# Patient Record
Sex: FEMALE | Race: BLACK OR AFRICAN AMERICAN | Employment: FULL TIME | URBAN - METROPOLITAN AREA
[De-identification: names, ages, dates, MRNs, and addresses within clinical notes are randomized per-mention and may not be internally consistent; named-entity substitution may affect disease eponyms.]

---

## 2018-01-25 ENCOUNTER — HOSPITAL ENCOUNTER (OUTPATIENT)
Dept: MAMMOGRAPHY | Age: 63
Discharge: HOME | End: 2018-01-25
Attending: FAMILY MEDICINE
Payer: COMMERCIAL

## 2018-01-25 ENCOUNTER — OFFICE VISIT (OUTPATIENT)
Dept: FAMILY MEDICINE CLINIC | Age: 63
End: 2018-01-25

## 2018-01-25 VITALS
OXYGEN SATURATION: 98 % | HEIGHT: 62 IN | SYSTOLIC BLOOD PRESSURE: 136 MMHG | WEIGHT: 182.9 LBS | RESPIRATION RATE: 20 BRPM | TEMPERATURE: 98 F | DIASTOLIC BLOOD PRESSURE: 88 MMHG | BODY MASS INDEX: 33.66 KG/M2 | HEART RATE: 68 BPM

## 2018-01-25 DIAGNOSIS — M65.311 TRIGGER FINGER OF RIGHT THUMB: Primary | ICD-10-CM

## 2018-01-25 DIAGNOSIS — Z12.31 VISIT FOR SCREENING MAMMOGRAM: ICD-10-CM

## 2018-01-25 PROCEDURE — 77063 BREAST TOMOSYNTHESIS BI: CPT

## 2018-01-25 RX ORDER — NAPROXEN 500 MG/1
500 TABLET ORAL 2 TIMES DAILY WITH MEALS
Qty: 20 TAB | Refills: 0 | Status: SHIPPED | OUTPATIENT
Start: 2018-01-25 | End: 2018-05-09 | Stop reason: SDUPTHER

## 2018-01-25 NOTE — LETTER
NOTIFICATION RETURN TO WORK  
 
1/25/2018 8:44 AM 
 
Ms. Autumn ERIC Coaxum [address] [place] [address] [phone] To Whom It May Concern: 
 
Jamee Castillo is currently under the care of [address] [place] [address]. She will be off of work today, 1/25/18 and return to work on her regularly scheduled day. If there are questions or concerns please have the patient contact our office.  
 
 
 
Sincerely, 
 
 
Josefina Calle NP

## 2018-01-25 NOTE — PROGRESS NOTES
1. Have you been to the ER, urgent care clinic since your last visit? Hospitalized since your last visit? No    2. Have you seen or consulted any other health care providers outside of the [address] [place] [address] since your last visit? Include any pap smears or colon screening. No  Chief Complaint   Patient presents with    Finger Pain     Right finger thumb locking up on patient.

## 2018-01-25 NOTE — MR AVS SNAPSHOT
[address] [place] [id number] 
[phone] Patient: Malena Toro MRN: [id number] :1955 Visit Information Date & Time Provider Department Dept. Phone Encounter #  
 2018  8:30 AM Kedar Ray NP [address] [address] [phone] [id number] Follow-up Instructions Return if symptoms worsen or fail to improve. Upcoming Health Maintenance Date Due Hepatitis C Screening 1956 DTaP/Tdap/Td series (1 - Tdap) 1977 FOBT Q 1 YEAR AGE 50-75 2006 ZOSTER VACCINE AGE 60> 10/17/2016 Influenza Age 5 to Adult 2017 PAP AKA CERVICAL CYTOLOGY 2017 BREAST CANCER SCRN MAMMOGRAM 2018 Allergies as of 2018  Review Complete On: 2018 By: Kedar Ray NP No Known Allergies Current Immunizations  Never Reviewed No immunizations on file. Not reviewed this visit You Were Diagnosed With   
  
 Codes Comments Trigger finger of right thumb    -  Primary ICD-10-CM: W75.128 ICD-9-CM: 727.03 Vitals BP Pulse Temp Resp Height(growth percentile) Weight(growth percentile) 136/88 68 98 °F (36.7 °C) (Oral) 20 5' 2\" (1.575 m) 182 lb 14.4 oz (83 kg) SpO2 BMI OB Status Smoking Status 98% 33.45 kg/m2 Menopause Former Smoker BMI and BSA Data Body Mass Index Body Surface Area  
 33.45 kg/m 2 1.91 m 2 Preferred Pharmacy Pharmacy Name Phone [facility], [address] [phone] Your Updated Medication List  
  
   
This list is accurate as of: 18  8:48 AM.  Always use your most recent med list.  
  
  
  
  
 naproxen 500 mg tablet Commonly known as:  NAPROSYN Take 1 Tab by mouth two (2) times daily (with meals). Prescriptions Sent to Pharmacy  Refills  
 naproxen (NAPROSYN) 500 mg tablet 0  
 Sig: Take 1 Tab by mouth two (2) times daily (with meals). Class: Normal  
 Pharmacy: [email or web address] [address] [place] [address], [address] [place] [address] #: [phone] Route: Oral  
  
Follow-up Instructions Return if symptoms worsen or fail to improve. Patient Instructions Trigger Finger: Care Instructions Your Care Instructions A trigger finger is a finger stuck in a bent position. The bent finger usually straightens out on its own. A trigger finger can be painful, but it normally is not a serious problem. Trigger fingers seem to occur more in some groups of people. These include people who have diabetes or arthritis or who have injured their hands in the past. This problem also occurs in musicians and people who  tools often. Rest, exercises, and other things you can do at home may help your trigger finger relax so that it can bend as it should. You may get a corticosteroid shot. This can reduce swelling and pain. Your doctor may put a splint on your finger. This will give your finger some rest and avoid irritating the joint. You may need surgery if the finger keeps locking in a bent position. Follow-up care is a key part of your treatment and safety. Be sure to make and go to all appointments, and call your doctor if you are having problems. It's also a good idea to know your test results and keep a list of the medicines you take. How can you care for yourself at home? · If your doctor put a splint on your finger, wear the splint as directed. Do not remove it until your doctor says you can. · You may need to change your activities to avoid movements that irritate the finger. · If your finger is swollen, put ice or a cold pack on your finger for 10 to 20 minutes at a time. Try to do this every 1 to 2 hours for the next 3 days (when you are awake) or until the swelling goes down. Put a thin cloth between the ice and your skin. · Prop up your hand on a pillow when you ice it or anytime you sit or lie down during the next 3 days. Try to keep it above the level of your heart. This will help reduce swelling. · Take your medicines exactly as prescribed. Call your doctor if you think you are having a problem with your medicine. · Ask your doctor if you can take an over-the-counter pain medicine, such as acetaminophen (Tylenol), ibuprofen (Advil, Motrin), or naproxen (Aleve). Be safe with medicines. Read and follow all instructions on the label. · If your doctor recommends exercises, do them as directed. When should you call for help? Call your doctor now or seek immediate medical care if: 
? · Your finger locks in a bent position and will not straighten. ? Watch closely for changes in your health, and be sure to contact your doctor if: 
? · You do not get better as expected. Where can you learn more? Go to http://joelle-mariel.info/. Enter M826 in the search box to learn more about \"Trigger Finger: Care Instructions. \" Current as of: March 21, 2017 Content Version: 11.4 © 0260-4477 [employer]. Care instructions adapted under license by [employer] (which disclaims liability or warranty for this information). If you have questions about a medical condition or this instruction, always ask your healthcare professional. [address] any warranty or liability for your use of this information. Introducing [facility] & HEALTH SERVICES! Lucrecia Chaudhary introduces [employer] patient portal. Now you can access parts of your medical record, email your doctor's office, and request medication refills online. 1. In your internet browser, go to [email or web address] 2. Click on the First Time User? Click Here link in the Sign In box. You will see the New Member Sign Up page. 3. Enter your [employer] Access Code exactly as it appears below.  You will not need to use this code after youve completed the sign-up process. If you do not sign up before the expiration date, you must request a new code. · [employer] Access Code: [email or web address] Expires: 4/19/2018  2:58 PM 
 
4. Enter the last four digits of your Social Security Number (xxxx) and Date of Birth (mm/dd/yyyy) as indicated and click Submit. You will be taken to the next sign-up page. 5. Create a [employer] ID. This will be your [employer] login ID and cannot be changed, so think of one that is secure and easy to remember. 6. Create a [employer] password. You can change your password at any time. 7. Enter your Password Reset Question and Answer. This can be used at a later time if you forget your password. 8. Enter your e-mail address. You will receive e-mail notification when new information is available in [address] 9. Click Sign Up. You can now view and download portions of your medical record. 10. Click the Download Summary menu link to download a portable copy of your medical information. If you have questions, please visit the Frequently Asked Questions section of the [employer] website. Remember, [employer] is NOT to be used for urgent needs. For medical emergencies, dial 911. Now available from your iPhone and Android! Please provide this summary of care documentation to your next provider. Your primary care clinician is listed as Dayo Pierce. If you have any questions after today's visit, please call [phone].

## 2018-01-25 NOTE — PROGRESS NOTES
Haile Maciel is a 65 y.o.  female and presents with    Chief Complaint   Patient presents with    Finger Pain       Subjective:  Ms. Brooke Samuels presents today with complaints of pain to her right thumb. Pain has been present for the past 3 weeks. She states she has had swelling. When she tries to  her thumb she feels a \"clicking\" sensation. She has had to tape up her finger in order to function. She has never had this before. She denies injury/trauma. She is right handed. She has not tried any OTC medications for her symptoms. She has tried running hot water over her thumb with some relief. Additional Concerns: No       Patient Active Problem List   Diagnosis Code    Postmenopausal state Z78.0    Dyspareunia [id number]       No Known Allergies  History reviewed. No pertinent past medical history. History reviewed. No pertinent surgical history. Family History   Problem Relation Age of Onset    Liver Disease Mother      cirrohosis     Social History   Substance Use Topics    Smoking status: Former Smoker    Smokeless tobacco: Never Used    Alcohol use Yes      Comment: socially        ROS   History obtained from the patient  General ROS: negative for - chills or fever  Musculoskeletal ROS: positive for -right thumb joint stiffness and joint swelling    All other systems reviewed and are negative. Objective:  Vitals:    01/25/18 0830   BP: 136/88   Pulse: 68   Resp: 20   Temp: 98 °F (36.7 °C)   TempSrc: Oral   SpO2: 98%   Weight: 182 lb 14.4 oz (83 kg)   Height: 5' 2\" (1.575 m)   PainSc:   8   PainLoc: Finger       PE  General appearance - alert, well appearing, and in no distress  Mental status - normal mood, behavior, speech, dress, motor activity, and thought processes  Musculoskeletal - swelling noted to right thumb; normal cap refill; decreased ROM; tenderness to base of thumb    Assessment/Plan:    1.  Trigger Finger of Right Thumb- Naproxen BID x 10 days; spica splint; return to office if symptoms persist    Lab review: no lab studies available for review at time of visit      Today's Visit: Naproxen    Health Maintenance:     I have discussed the diagnosis with the patient and the intended plan as seen in the above orders. The patient has received an after-visit summary and questions were answered concerning future plans. I have discussed medication side effects and warnings with the patient as well. I have reviewed the plan of care with the patient, accepted their input and they are in agreement with the treatment goals. Follow-up Disposition:  Return if symptoms worsen or fail to improve. More than 1/2 of this 15 minute visit was spent in counseling and coordination of care, as described above.     Anna Constantino, SHERRIC

## 2018-01-25 NOTE — PATIENT INSTRUCTIONS
Trigger Finger: Care Instructions  Your Care Instructions  A trigger finger is a finger stuck in a bent position. The bent finger usually straightens out on its own. A trigger finger can be painful, but it normally is not a serious problem. Trigger fingers seem to occur more in some groups of people. These include people who have diabetes or arthritis or who have injured their hands in the past. This problem also occurs in musicians and people who  tools often. Rest, exercises, and other things you can do at home may help your trigger finger relax so that it can bend as it should. You may get a corticosteroid shot. This can reduce swelling and pain. Your doctor may put a splint on your finger. This will give your finger some rest and avoid irritating the joint. You may need surgery if the finger keeps locking in a bent position. Follow-up care is a key part of your treatment and safety. Be sure to make and go to all appointments, and call your doctor if you are having problems. It's also a good idea to know your test results and keep a list of the medicines you take. How can you care for yourself at home? · If your doctor put a splint on your finger, wear the splint as directed. Do not remove it until your doctor says you can. · You may need to change your activities to avoid movements that irritate the finger. · If your finger is swollen, put ice or a cold pack on your finger for 10 to 20 minutes at a time. Try to do this every 1 to 2 hours for the next 3 days (when you are awake) or until the swelling goes down. Put a thin cloth between the ice and your skin. · Prop up your hand on a pillow when you ice it or anytime you sit or lie down during the next 3 days. Try to keep it above the level of your heart. This will help reduce swelling. · Take your medicines exactly as prescribed. Call your doctor if you think you are having a problem with your medicine.   · Ask your doctor if you can take an over-the-counter pain medicine, such as acetaminophen (Tylenol), ibuprofen (Advil, Motrin), or naproxen (Aleve). Be safe with medicines. Read and follow all instructions on the label. · If your doctor recommends exercises, do them as directed. When should you call for help? Call your doctor now or seek immediate medical care if:  ? · Your finger locks in a bent position and will not straighten. ? Watch closely for changes in your health, and be sure to contact your doctor if:  ? · You do not get better as expected. Where can you learn more? Go to http://joelle-mariel.info/. Enter M826 in the search box to learn more about \"Trigger Finger: Care Instructions. \"  Current as of: March 21, 2017  Content Version: 11.4  © 3381-8168 [employer]. Care instructions adapted under license by [employer] (which disclaims liability or warranty for this information). If you have questions about a medical condition or this instruction, always ask your healthcare professional. [address] any warranty or liability for your use of this information.

## 2018-05-09 ENCOUNTER — OFFICE VISIT (OUTPATIENT)
Dept: FAMILY MEDICINE CLINIC | Age: 63
End: 2018-05-09

## 2018-05-09 VITALS
HEIGHT: 62 IN | WEIGHT: 176 LBS | HEART RATE: 65 BPM | BODY MASS INDEX: 32.39 KG/M2 | SYSTOLIC BLOOD PRESSURE: 139 MMHG | DIASTOLIC BLOOD PRESSURE: 88 MMHG | OXYGEN SATURATION: 99 % | RESPIRATION RATE: 16 BRPM | TEMPERATURE: 96 F

## 2018-05-09 DIAGNOSIS — Z12.11 SCREEN FOR COLON CANCER: ICD-10-CM

## 2018-05-09 DIAGNOSIS — M79.644 THUMB PAIN, RIGHT: Primary | ICD-10-CM

## 2018-05-09 DIAGNOSIS — Z13.220 SCREENING CHOLESTEROL LEVEL: ICD-10-CM

## 2018-05-09 DIAGNOSIS — Z11.59 NEED FOR HEPATITIS C SCREENING TEST: ICD-10-CM

## 2018-05-09 DIAGNOSIS — Z00.00 WELL WOMAN EXAM (NO GYNECOLOGICAL EXAM): ICD-10-CM

## 2018-05-09 RX ORDER — NAPROXEN 500 MG/1
500 TABLET ORAL 2 TIMES DAILY WITH MEALS
Qty: 60 TAB | Refills: 0 | Status: SHIPPED | OUTPATIENT
Start: 2018-05-09 | End: 2020-02-03

## 2018-05-09 NOTE — MR AVS SNAPSHOT
303 [facility] 
 
 
 [address] [facility] [address] [place] [address] 
[phone] Patient: Lisa Blandon MRN: [id number] :1955 Visit Information Date & Time Provider Department Dept. Phone Encounter #  
 2018 10:20 AM Ramirez Ann[address]  [id number] Follow-up Instructions Return in about 6 months (around 2018). Upcoming Health Maintenance Date Due Hepatitis C Screening 1956 DTaP/Tdap/Td series (1 - Tdap) 1977 FOBT Q 1 YEAR AGE 50-75 2006 ZOSTER VACCINE AGE 60> 10/17/2016 PAP AKA CERVICAL CYTOLOGY 2017 Influenza Age 5 to Adult 2018 BREAST CANCER SCRN MAMMOGRAM 2020 Allergies as of 2018  Review Complete On: 2018 By: Ramirez Ann DO No Known Allergies Current Immunizations  Never Reviewed No immunizations on file. Not reviewed this visit You Were Diagnosed With   
  
 Codes Comments Thumb pain, right    -  Primary ICD-10-CM: J56.710 ICD-9-CM: 729.5 Screening cholesterol level     ICD-10-CM: V98.782 ICD-9-CM: V77.91 Well woman exam (no gynecological exam)     ICD-10-CM: Z00.00 ICD-9-CM: V70.0 Screen for colon cancer     ICD-10-CM: Z12.11 ICD-9-CM: V76.51 Need for hepatitis C screening test     ICD-10-CM: Z11.59 
ICD-9-CM: V73.89 Vitals BP Pulse Temp Resp Height(growth percentile) Weight(growth percentile) 139/88 (BP 1 Location: Right arm, BP Patient Position: Sitting) 65 96 °F (35.6 °C) (Oral) 16 5' 2\" (1.575 m) 176 lb (79.8 kg) SpO2 BMI OB Status Smoking Status 99% 32.19 kg/m2 Menopause Former Smoker BMI and BSA Data Body Mass Index Body Surface Area  
 32.19 kg/m 2 1.87 m 2 Preferred Pharmacy Pharmacy Name Phone [facility], [address] [facility] 11 [facility] [phone] Your Updated Medication List  
  
   
This list is accurate as of 5/9/18 11:17 AM.  Always use your most recent med list.  
  
  
  
  
 naproxen 500 mg tablet Commonly known as:  NAPROSYN Take 1 Tab by mouth two (2) times daily (with meals). Prescriptions Sent to Pharmacy Refills  
 naproxen (NAPROSYN) 500 mg tablet 0 Sig: Take 1 Tab by mouth two (2) times daily (with meals). Class: Normal  
 Pharmacy: [facility] [address] [place] [address], [address] #: [phone] Route: Oral  
  
We Performed the Following REFERRAL TO ORTHOPEDIC SURGERY [REF62 Custom] Comments:  
 Patient prefers to stay in [place]. REFERRAL TO PHYSICAL THERAPY [BHV42 Custom] Follow-up Instructions Return in about 6 months (around 11/9/2018). To-Do List   
 05/09/2018 Lab:  HEPATITIS C AB   
  
 05/09/2018 Lab:  LIPID PANEL   
  
 05/09/2018 Lab:  METABOLIC PANEL, COMPREHENSIVE   
  
 06/08/2018 Lab:  OCCULT BLOOD, IMMUNOASSAY (FIT) Referral Information Referral ID Referred By Referred To  
  
 [id number] Clarissa Zambrano MD   
   [place] [address] [address] [place] [address] Phone: [phone] Fax: [phone] Visits Status Start Date End Date 1 New Request 5/9/18 5/9/19 If your referral has a status of pending review or denied, additional information will be sent to support the outcome of this decision. Referral ID Referred By Referred To  
 [id number] [facility], [facility]  
   Neetu Pateliredo 165 8[address] [facility], [address] [place] [address] Phone: [phone] Fax: [phone] Visits Status Start Date End Date 1 New Request 5/9/18 5/9/19 If your referral has a status of pending review or denied, additional information will be sent to support the outcome of this decision. Patient Instructions Well Visit, Women 48 to 72: Care Instructions Your Care Instructions Physical exams can help you stay healthy. Your doctor has checked your overall health and may have suggested ways to take good care of yourself. He or she also may have recommended tests. At home, you can help prevent illness with healthy eating, regular exercise, and other steps. Follow-up care is a key part of your treatment and safety. Be sure to make and go to all appointments, and call your doctor if you are having problems. It's also a good idea to know your test results and keep a list of the medicines you take. How can you care for yourself at home? · Reach and stay at a healthy weight. This will lower your risk for many problems, such as obesity, diabetes, heart disease, and high blood pressure. · Get at least 30 minutes of exercise on most days of the week. Walking is a good choice. You also may want to do other activities, such as running, swimming, cycling, or playing tennis or team sports. · Do not smoke. Smoking can make health problems worse. If you need help quitting, talk to your doctor about stop-smoking programs and medicines. These can increase your chances of quitting for good. · Protect your skin from too much sun. When you're outdoors from 10 a.m. to 4 p.m., stay in the shade or cover up with clothing and a hat with a wide brim. Wear sunglasses that block UV rays. Even when it's cloudy, put broad-spectrum sunscreen (SPF 30 or higher) on any exposed skin. · See a dentist one or two times a year for checkups and to have your teeth cleaned. · Wear a seat belt in the car. · Limit alcohol to 1 drink a day. Too much alcohol can cause health problems. Follow your doctor's advice about when to have certain tests. These tests can spot problems early. · Cholesterol.  Your doctor will tell you how often to have this done based on your age, family history, or other things that can increase your risk for heart attack and stroke. · Blood pressure. Have your blood pressure checked during a routine doctor visit. Your doctor will tell you how often to check your blood pressure based on your age, your blood pressure results, and other factors. · Mammogram. Ask your doctor how often you should have a mammogram, which is an X-ray of your breasts. A mammogram can spot breast cancer before it can be felt and when it is easiest to treat. · Pap test and pelvic exam. Ask your doctor how often you should have a Pap test. You may not need to have a Pap test as often as you used to. · Vision. Have your eyes checked every year or two or as often as your doctor suggests. Some experts recommend that you have yearly exams for glaucoma and other age-related eye problems starting at age 48. · Hearing. Tell your doctor if you notice any change in your hearing. You can have tests to find out how well you hear. · Diabetes. Ask your doctor whether you should have tests for diabetes. · Colon cancer. You should begin tests for colon cancer at age 48. You may have one of several tests. Your doctor will tell you how often to have tests based on your age and risk. Risks include whether you already had a precancerous polyp removed from your colon or whether your parents, sisters and brothers, or children have had colon cancer. · Thyroid disease. Talk to your doctor about whether to have your thyroid checked as part of a regular physical exam. Women have an increased chance of a thyroid problem. · Osteoporosis. You should begin tests for bone density at age 72. If you are younger than 72, ask your doctor whether you have factors that may increase your risk for this disease. You may want to have this test before age 72. · Heart attack and stroke risk. At least every 4 to 6 years, you should have your risk for heart attack and stroke assessed.  Your doctor uses factors such as your age, blood pressure, cholesterol, and whether you smoke or have diabetes to show what your risk for a heart attack or stroke is over the next 10 years. When should you call for help? Watch closely for changes in your health, and be sure to contact your doctor if you have any problems or symptoms that concern you. Where can you learn more? Go to http://joelle-mariel.info/. Enter J312 in the search box to learn more about \"Well Visit, Women 50 to 72: Care Instructions. \" Current as of: May 12, 2017 Content Version: 11.4 © 1219-7107 [employer]. Care instructions adapted under license by [employer] (which disclaims liability or warranty for this information). If you have questions about a medical condition or this instruction, always ask your healthcare professional. [address] any warranty or liability for your use of this information. Introducing [facility] & HEALTH SERVICES! Quan Ceja introduces [employer] patient portal. Now you can access parts of your medical record, email your doctor's office, and request medication refills online. 1. In your internet browser, go to [email or web address] 2. Click on the First Time User? Click Here link in the Sign In box. You will see the New Member Sign Up page. 3. Enter your N[employer] Access Code exactly as it appears below. You will not need to use this code after youve completed the sign-up process. If you do not sign up before the expiration date, you must request a new code. · [employer] Access Code: [email or web address] Expires: 8/7/2018  8:12 AM 
 
4. Enter the last four digits of your Social Security Number (xxxx) and Date of Birth (mm/dd/yyyy) as indicated and click Submit. You will be taken to the next sign-up page. 5. Create a Ntirety ID. This will be your Ntirety login ID and cannot be changed, so think of one that is secure and easy to remember. 6. Create a Peers Appt password. You can change your password at any time. 7. Enter your Password Reset Question and Answer. This can be used at a later time if you forget your password. 8. Enter your e-mail address. You will receive e-mail notification when new information is available in [address] 9. Click Sign Up. You can now view and download portions of your medical record. 10. Click the Download Summary menu link to download a portable copy of your medical information. If you have questions, please visit the Frequently Asked Questions section of the [employer] website. Remember, [employer] is NOT to be used for urgent needs. For medical emergencies, dial 911. Now available from your iPhone and Android! Please provide this summary of care documentation to your next provider. Your primary care clinician is listed as Tiara Martinez. If you have any questions after today's visit, please call [phone].

## 2018-05-09 NOTE — PROGRESS NOTES
Subjective:     HPI:  Alex Bains is a 65 y.o. female who presents to the office complaining of R thumb pain and skin irritation. R hand pain: Pt complains of pain in her R thumb for past 4 months. She visited Ms. Speedy NP on 1/25/2018 and was prescribed Naproxen for trigger finger of R thumb. She reports good relief of pain with Naproxen. She is soaking the joint with hot water and applying muscle rub on it. She continues to have pain. She is using a thumb splica spint to immobilize her thumb. 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 Gluteal cleft irritation: Pt complains of irritation and burning in her gluteal cleft for past 1 week. She reports that she sweats throughout the day. She cleans the area and apply Vaseline with relief of burning sensation. She admits to cleaning the area properly after bowel movement. Her last intimate contact was few years ago. Of note,   Pt quit smoking a year ago as she was not able to afford buying cigarettes anymore. Current Outpatient Prescriptions   Medication Sig Dispense Refill    naproxen (NAPROSYN) 500 mg tablet Take 1 Tab by mouth two (2) times daily (with meals). 60 Tab 0        No Known Allergies    History reviewed. No pertinent past medical history. History reviewed. No pertinent surgical history. Family History   Problem Relation Age of Onset    Liver Disease Mother      cirrohosis       Social History     Social History    Marital status: SINGLE     Spouse name: N/A    Number of children: N/A    Years of education: N/A     Occupational History    Not on file.      Social History Main Topics    Smoking status: Former Smoker     Types: Cigarettes    Smokeless tobacco: Never Used      Comment: quit 2017    Alcohol use Yes      Comment: socially     Drug use: No    Sexual activity: Not Currently     Other Topics Concern    Not on file     Social History Narrative       REVIEW OF SYSTEM:  Review of Systems   Constitutional: Negative for chills and fever. Eyes: Negative for blurred vision. Respiratory: Negative for shortness of breath. Cardiovascular: Negative for chest pain, palpitations and leg swelling. Gastrointestinal: Negative for constipation, diarrhea, nausea and vomiting. Musculoskeletal: Positive for joint pain (R thumb). Neurological: Negative for headaches. Objective:     Visit Vitals    /88 (BP 1 Location: Right arm, BP Patient Position: Sitting)    Pulse 65    Temp 96 °F (35.6 °C) (Oral)    Resp 16    Ht 5' 2\" (1.575 m)    Wt 176 lb (79.8 kg)    SpO2 99%    BMI 32.19 kg/m2       PHYSICAL EXAM:  Physical Exam   Constitutional: She is oriented to person, place, and time and well-developed, well-nourished, and in no distress. HENT:   Right Ear: Tympanic membrane, external ear and ear canal normal.   Left Ear: Tympanic membrane, external ear and ear canal normal.   Nose: Nose normal.   Mouth/Throat: Oropharynx is clear and moist.   Eyes: Pupils are equal, round, and reactive to light. Neck: Normal range of motion. Neck supple. No thyromegaly present. Cardiovascular: Normal rate, regular rhythm, normal heart sounds and intact distal pulses. No murmur heard. Pulmonary/Chest: Effort normal and breath sounds normal. She has no wheezes. Musculoskeletal:        Right hand: She exhibits tenderness. Normal strength noted. Hands:  Neurological: She is alert and oriented to person, place, and time. GCS score is 15. Skin: Skin is warm and dry. Vitals reviewed. Assessment/Plan:       ICD-10-CM ICD-9-CM    1. Thumb pain, right M79.644 729.5 REFERRAL TO ORTHOPEDIC SURGERY      REFERRAL TO PHYSICAL THERAPY      naproxen (NAPROSYN) 500 mg tablet   2. Screening cholesterol level W13.394 R46.77 METABOLIC PANEL, COMPREHENSIVE      LIPID PANEL   3. Well woman exam (no gynecological exam) Z00.00 V70.0    4.  Screen for colon cancer Z12.11 V76.51 OCCULT BLOOD, IMMUNOASSAY (FIT)   5. Need for hepatitis C screening test Z11.59 V73.89 HEPATITIS C AB     Patient given opportunity to ask questions. Questions answered. Take Naproxen as needed with food. Pt referred to physical therapy for R thumb pain. Patient understands plan of care. Follow-up Disposition: Not on File    Written by Cedrick Haywood, as dictated by Alice Mcdonough DO.    I, Dr. Alice Mcdonough, confirm that all documentation is accurate.

## 2018-05-09 NOTE — PROGRESS NOTES
1. Have you been to the ER, urgent care clinic since your last visit? Hospitalized since your last visit? No    2. Have you seen or consulted any other health care providers outside of the [address] [place] [address] since your last visit? Include any pap smears or colon screening.  No     Patient presents in office today for CPE  Patient concerns: irritation in crack of the buttocks, right hand pain

## 2018-05-09 NOTE — PROGRESS NOTES
Subjective:   Kimmy Ellis is a 65 y.o. female who presents for annual routine checkup. LMP: No LMP recorded. Patient is not currently having periods (Reason: Menopause). Last PAP?: 09/04/2014, was normal   History of abnormal PAP: none   # partners in last year: 0   Concerns for STD?: no   Abnormal vaginal discharge: no   Family history for GYN cancer: no   Family history breast cancer: no   Mammogram: 01/25/2018, was normal   Family history for colon cancer: no   Colonoscopy: never     Current Outpatient Prescriptions   Medication Sig Dispense Refill    naproxen (NAPROSYN) 500 mg tablet Take 1 Tab by mouth two (2) times daily (with meals). 20 Tab 0       No Known Allergies    History reviewed. No pertinent past medical history. History reviewed. No pertinent surgical history. Family History   Problem Relation Age of Onset    Liver Disease Mother      cirrohosis       Social History     Social History    Marital status: SINGLE     Spouse name: N/A    Number of children: N/A    Years of education: N/A     Occupational History    Not on file. Social History Main Topics    Smoking status: Former Smoker     Types: Cigarettes    Smokeless tobacco: Never Used      Comment: quit 2017    Alcohol use Yes      Comment: socially     Drug use: No    Sexual activity: Not Currently     Other Topics Concern    Not on file     Social History Narrative       Review of Systems   Constitutional: Negative for chills and fever. Eyes: Negative for blurred vision. Respiratory: Negative for shortness of breath. Cardiovascular: Negative for chest pain, palpitations and leg swelling. Gastrointestinal: Negative for constipation, diarrhea, nausea and vomiting. Musculoskeletal: Positive for joint pain (thumb). Neurological: Negative for headaches.      Objective:     Visit Vitals    /88 (BP 1 Location: Right arm, BP Patient Position: Sitting)    Pulse 65    Temp 96 °F (35.6 °C) (Oral)    Resp 16    Ht 5' 2\" (1.575 m)    Wt 176 lb (79.8 kg)    SpO2 99%    BMI 32.19 kg/m2        Hearing Screening    125Hz 250Hz 500Hz 1000Hz 2000Hz 3000Hz 4000Hz 6000Hz 8000Hz   Right ear:   Pass Pass Pass  Pass     Left ear: Fail Pass Pass  Fail        Visual Acuity Screening    Right eye Left eye Both eyes   Without correction:      With correction: 20/20 20/30 20/20       Physical Exam   Constitutional: She is oriented to person, place, and time and well-developed, well-nourished, and in no distress. HENT:   Right Ear: Tympanic membrane, external ear and ear canal normal.   Left Ear: Tympanic membrane, external ear and ear canal normal.   Nose: Nose normal.   Mouth/Throat: Oropharynx is clear and moist.   Eyes: Pupils are equal, round, and reactive to light. Neck: Normal range of motion. Neck supple. No thyromegaly present. Cardiovascular: Normal rate, regular rhythm, normal heart sounds and intact distal pulses. No murmur heard. Pulmonary/Chest: Effort normal and breath sounds normal. She has no wheezes. Musculoskeletal:        Hands:  Neurological: She is alert and oriented to person, place, and time. GCS score is 15. Skin: Skin is warm and dry. Vitals reviewed. Assessment/Plan:   well woman  return annually or prn    ICD-10-CM ICD-9-CM    1. Thumb pain, right M79.644 729.5 REFERRAL TO ORTHOPEDIC SURGERY      REFERRAL TO PHYSICAL THERAPY      naproxen (NAPROSYN) 500 mg tablet   2. Screening cholesterol level F64.540 E84.42 METABOLIC PANEL, COMPREHENSIVE      LIPID PANEL      METABOLIC PANEL, COMPREHENSIVE      LIPID PANEL   3. Well woman exam (no gynecological exam) Z00.00 V70.0    4. Screen for colon cancer Z12.11 V76.51 OCCULT BLOOD, IMMUNOASSAY (FIT)      OCCULT BLOOD, IMMUNOASSAY (FIT)   5. Need for hepatitis C screening test Z11.59 V73.89 HEPATITIS C AB      HEPATITIS C AB   . Patient given opportunity to ask questions. Questions answered. FIT screening test ordered today.  Will screen for Hep C. Patient understands plan of care. Follow-up Disposition:  Return in about 6 months (around 11/9/2018). Written by Kirsten Flores, as dictated by DO. SERENE Silver, Dr. Keyla Ball, confirm that all documentation is accurate.

## 2018-05-09 NOTE — PATIENT INSTRUCTIONS
Well Visit, Women 48 to 72: Care Instructions  Your Care Instructions    Physical exams can help you stay healthy. Your doctor has checked your overall health and may have suggested ways to take good care of yourself. He or she also may have recommended tests. At home, you can help prevent illness with healthy eating, regular exercise, and other steps. Follow-up care is a key part of your treatment and safety. Be sure to make and go to all appointments, and call your doctor if you are having problems. It's also a good idea to know your test results and keep a list of the medicines you take. How can you care for yourself at home? · Reach and stay at a healthy weight. This will lower your risk for many problems, such as obesity, diabetes, heart disease, and high blood pressure. · Get at least 30 minutes of exercise on most days of the week. Walking is a good choice. You also may want to do other activities, such as running, swimming, cycling, or playing tennis or team sports. · Do not smoke. Smoking can make health problems worse. If you need help quitting, talk to your doctor about stop-smoking programs and medicines. These can increase your chances of quitting for good. · Protect your skin from too much sun. When you're outdoors from 10 a.m. to 4 p.m., stay in the shade or cover up with clothing and a hat with a wide brim. Wear sunglasses that block UV rays. Even when it's cloudy, put broad-spectrum sunscreen (SPF 30 or higher) on any exposed skin. · See a dentist one or two times a year for checkups and to have your teeth cleaned. · Wear a seat belt in the car. · Limit alcohol to 1 drink a day. Too much alcohol can cause health problems. Follow your doctor's advice about when to have certain tests. These tests can spot problems early. · Cholesterol.  Your doctor will tell you how often to have this done based on your age, family history, or other things that can increase your risk for heart attack and stroke. · Blood pressure. Have your blood pressure checked during a routine doctor visit. Your doctor will tell you how often to check your blood pressure based on your age, your blood pressure results, and other factors. · Mammogram. Ask your doctor how often you should have a mammogram, which is an X-ray of your breasts. A mammogram can spot breast cancer before it can be felt and when it is easiest to treat. · Pap test and pelvic exam. Ask your doctor how often you should have a Pap test. You may not need to have a Pap test as often as you used to. · Vision. Have your eyes checked every year or two or as often as your doctor suggests. Some experts recommend that you have yearly exams for glaucoma and other age-related eye problems starting at age 48. · Hearing. Tell your doctor if you notice any change in your hearing. You can have tests to find out how well you hear. · Diabetes. Ask your doctor whether you should have tests for diabetes. · Colon cancer. You should begin tests for colon cancer at age 48. You may have one of several tests. Your doctor will tell you how often to have tests based on your age and risk. Risks include whether you already had a precancerous polyp removed from your colon or whether your parents, sisters and brothers, or children have had colon cancer. · Thyroid disease. Talk to your doctor about whether to have your thyroid checked as part of a regular physical exam. Women have an increased chance of a thyroid problem. · Osteoporosis. You should begin tests for bone density at age 72. If you are younger than 72, ask your doctor whether you have factors that may increase your risk for this disease. You may want to have this test before age 72. · Heart attack and stroke risk. At least every 4 to 6 years, you should have your risk for heart attack and stroke assessed.  Your doctor uses factors such as your age, blood pressure, cholesterol, and whether you smoke or have diabetes to show what your risk for a heart attack or stroke is over the next 10 years. When should you call for help? Watch closely for changes in your health, and be sure to contact your doctor if you have any problems or symptoms that concern you. Where can you learn more? Go to http://joelle-mariel.info/. Enter G926 in the search box to learn more about \"Well Visit, Women 50 to 72: Care Instructions. \"  Current as of: May 12, 2017  Content Version: 11.4  © 7391-9973 [employer]. Care instructions adapted under license by [employer] (which disclaims liability or warranty for this information). If you have questions about a medical condition or this instruction, always ask your healthcare professional. [address] any warranty or liability for your use of this information.

## 2018-05-09 NOTE — LETTER
NOTIFICATION RETURN TO WORK / SCHOOL 
 
5/9/2018 11:15 AM 
 
Ms. Autumn ERIC Coaxum [address] [place] [address] [phone] To Whom It May Concern: 
 
Philip Anand is currently under the care of [address] [place] [address]. She will return to work/school on: Saturday, May 12, 2018 If there are questions or concerns please have the patient contact our office.  
 
 
 
Sincerely, 
 
 
Casandra Minors, DO

## 2018-05-10 LAB
A-G RATIO,AGRAT: 1.4 RATIO (ref 1.1–2.6)
ALBUMIN SERPL-MCNC: 4.5 G/DL (ref 3.5–5)
ALP SERPL-CCNC: 59 U/L (ref 40–120)
ALT SERPL-CCNC: 12 U/L (ref 5–40)
ANION GAP SERPL CALC-SCNC: 15 MMOL/L
AST SERPL W P-5'-P-CCNC: 16 U/L (ref 10–37)
BILIRUB SERPL-MCNC: 0.3 MG/DL (ref 0.2–1.2)
BUN SERPL-MCNC: 14 MG/DL (ref 6–22)
CALCIUM SERPL-MCNC: 9.8 MG/DL (ref 8.4–10.5)
CHLORIDE SERPL-SCNC: 107 MMOL/L (ref 98–110)
CHOLEST SERPL-MCNC: 166 MG/DL (ref 110–200)
CO2 SERPL-SCNC: 24 MMOL/L (ref 20–32)
CREAT SERPL-MCNC: 0.7 MG/DL (ref 0.8–1.4)
GFRAA, 66117: >60
GFRNA, 66118: >60
GLOBULIN,GLOB: 3.3 G/DL (ref 2–4)
GLUCOSE SERPL-MCNC: 84 MG/DL (ref 70–99)
HCV AB SER IA-ACNC: NORMAL
HDLC SERPL-MCNC: 3 MG/DL (ref 0–5)
HDLC SERPL-MCNC: 55 MG/DL (ref 40–59)
LDLC SERPL CALC-MCNC: 91 MG/DL (ref 50–99)
POTASSIUM SERPL-SCNC: 4.3 MMOL/L (ref 3.5–5.5)
PROT SERPL-MCNC: 7.8 G/DL (ref 6.2–8.1)
SODIUM SERPL-SCNC: 146 MMOL/L (ref 133–145)
TRIGL SERPL-MCNC: 102 MG/DL (ref 40–149)
VLDLC SERPL CALC-MCNC: 20 MG/DL (ref 8–30)

## 2018-05-17 ENCOUNTER — HOSPITAL ENCOUNTER (OUTPATIENT)
Dept: PHYSICAL THERAPY | Age: 63
Discharge: HOME | End: 2018-05-17
Payer: COMMERCIAL

## 2018-05-17 PROCEDURE — 97165 OT EVAL LOW COMPLEX 30 MIN: CPT

## 2018-05-17 PROCEDURE — 97018 PARAFFIN BATH THERAPY: CPT

## 2018-05-17 NOTE — PROGRESS NOTES
OCCUPATIONAL THERAPY - DAILY TREATMENT NOTE    Patient Name: Kimmy Ellis        Date: 2018  : 1955   YES Patient  Verified  Visit #:     Insurance: Payor: Lisa Jean / Plan: [address] [place] [address] [place] [employer] / Product Type: HMO /      In time: 8:10 Out time: 9:00   Total Treatment Time: 50     TREATMENT AREA =  Right thumb    SUBJECTIVE    Pain Level (on 0 to 10 scale):  2-7  / 10   Medication Changes/New allergies or changes in medical history, any new surgeries or procedures? NO    If yes, update Summary List   Subjective Functional Status/Changes:  []  No changes reported     It doesn't hurt as much until I move it. OBJECTIVE    Modalities Rationale:     decrease pain and increase tissue extensibility to improve patient's ability to bend thumb    min [] Estim, type/location:                                      []  att     []  unatt     []  w/US     []  w/ice    []  w/heat    min []  Ultrasound, settings/location:      min []  Iontophoresis w/ dexamethasone, location:                                               []  take home patch       []  in clinic    min []  Ice     []  Heat    location/position:    10 min [x]  Paraffin:  location Right hand with heat     min []  Vasopneumatic Device, press/temp:     min []  Other:    [x] Skin assessment post-treatment (if applicable):    [x]  intact    []  redness- no adverse reaction     []redness - adverse reaction:         min Therapeutic Exercise:  [x]  See flow sheet        min Patient Education:  YES  Reviewed HEP- ROM right thumb and wrist   []  Progressed/Changed HEP based on: Other Objective/Functional Measures:    See initial eval for details      Post Treatment Pain Level (on 0 to 10) scale:   2  / 10     ASSESSMENT  Assessment/Changes in Function:     Patient presents with pain and stiffness right thumb affecting work tasks and should benefit from skilled OT to address deficits.   OT Eval Complexity Justification:  Patient History: Low-woke with pain  Examination : Mod- AROM;strength;pain and work tasks  Clinical Decision Making: Low-desires return to baseline       [x]  See Progress Note/Recertification   Patient will continue to benefit from skilled OT services to address ROM deficits and address strength deficits to attain remaining goals.    Progress toward goals / Updated goals:    See eval goals      PLAN  [x]  Upgrade activities as tolerated YES Continue plan of care   []  Discharge due to :    [x]  Other: OT 1-2x week x 4 weeks for goals      Therapist: CHRISTIANO Mg/L    Date: 5/17/2018 Time: 9:47 AM

## 2018-05-17 NOTE — PROGRESS NOTES
[facility] PHYSICAL THERAPY  [address] [place], [place] [address]stacy Aguilar, [address] - Phone: [phone]  Fax: [phone] / [facility]  Patient Name: Sun Rosado : 1955   Medical   Diagnosis: Thumb pain, right [Y48.475] Treatment Diagnosis: Right thumb pain    Onset Date: 2018     Referral Source: Doug Londono,  Start of Care [facility]): 2018   Prior Hospitalization: See medical history Provider #: [id number]   Prior Level of Function: Ind   Comorbidities: na   Medications: Verified on Patient Summary List   The Plan of Care and following information is based on the information from the initial evaluation.   ===========================================================================================  Assessment / key information: Patient is a 66 yo right handed female with right thumb pain. She has been wearing a thumb spica splint since January. AROM RUE is WNL except right wrist flexion 0-50; extension 0-55; thumb MP 0-25; IP 0-35 with opposition to small finger DIPJ. Right  18# with pain; left 39#. Right pinch 5-9# left 9-14#. Sensation intact. Pain 2-7/10. Trace edema thenar. ADLs/IADLs- modified Ind, difficulty with strength tasks and lifting.  ===========================================================================================  Eval Complexity: History: LOW Complexity : Brief history review ; Examination: MEDIUM Complexity : 3-5 performance deficits relating to physical, cognitive , or psychosocial skils that result in activity limitations and / or participation restrictions;  Decision Making:LOW Complexity : No comorbidities that affect functional and no verbal or physical assistance needed to complete eval tasks   Problem List: Pain effecting function, Decreased range of motion, Decreased strength and Decreased ADL/functional abilities    Treatment Plan may include any combination of the following: Therapeutic exercise, Therapeutic activities, Physical agent/modality, Patient education and ADLs/IADLs  Patient / Family readiness to learn indicated by: asking questions, trying to perform skills and interest  Persons(s) to be included in education: patient (P)  Barriers to Learning/Limitations: None  Measures taken:    Patient Goal (s): To get better   Patient self reported health status: excellent  Rehabilitation Potential: excellent   Short Term Goals: To be accomplished in 2 weeks: 1. Ind HEP  2. Decrease pain 2/10 with ADLs  3. Normal wrist AROM for ADLs   Long Term Goals: To be accomplished in 4  weeks: 1. Full thumb opposition to manipulate coins  2. Increase right  5-10# fot IADLs  3. Lift and carry 15# without pain  Frequency / Duration:   Patient to be seen 1-2  times per week for 4  weeks:  Patient / Caregiver education and instruction: exercises  G-Codes (GO): na  Therapist Signature: JORDANA Wayne Date: 6/22/4042   Certification Period: na Time: 9:47 AM   ===========================================================================================  I certify that the above Occupational Therapy Services are being furnished while the patient is under my care. I agree with the treatment plan and certify that this therapy is necessary. Physician Signature:        Date:       Time:     Please sign and return to [facility] or you may fax the signed copy to [phone]. Thank you.

## 2018-05-24 ENCOUNTER — HOSPITAL ENCOUNTER (OUTPATIENT)
Dept: PHYSICAL THERAPY | Age: 63
Discharge: HOME | End: 2018-05-24
Payer: COMMERCIAL

## 2018-05-24 PROCEDURE — 97110 THERAPEUTIC EXERCISES: CPT

## 2018-05-24 PROCEDURE — 97018 PARAFFIN BATH THERAPY: CPT

## 2018-05-24 NOTE — PROGRESS NOTES
OCCUPATIONAL THERAPY - DAILY TREATMENT NOTE    Patient Name: Alex Bains        Date: 2018  : 1955   YES Patient  Verified  Visit #:   2   of   8  Insurance: Payor: Zeinab Morris / Plan: [address] [place] [address] [place] HMO / Product Type: HMO /      In time: 4:15 Out time: 4:55   Total Treatment Time: 40     TREATMENT AREA =  Right thumb    SUBJECTIVE    Pain Level (on 0 to 10 scale):  0-3  / 10   Medication Changes/New allergies or changes in medical history, any new surgeries or procedures? NO    If yes, update Summary List   Subjective Functional Status/Changes:  []  No changes reported     My thumb has felt better since you put that (wax) stuff on it. OBJECTIVE    Modalities Rationale:     decrease pain and increase tissue extensibility to improve patient's ability to use right hand with work tasks    min [] Estim, type/location:                                      []  att     []  unatt     []  w/US     []  w/ice    []  w/heat    min []  Ultrasound, settings/location:      min []  Iontophoresis w/ dexamethasone, location:                                               []  take home patch       []  in clinic    min []  Ice     []  Heat    location/position:    15 min [x]  Paraffin:  location Right hand with heat    min []  Vasopneumatic Device, press/temp:     min []  Other:    [x] Skin assessment post-treatment (if applicable):    [x]  intact    []  redness- no adverse reaction     []redness - adverse reaction:        25 min Therapeutic Exercise:  [x]  See flow sheet   Rationale:      increase ROM and increase strength to improve the patients ability to use right hand with work      min Patient Education:  YES  Reviewed HEP   []  Progressed/Changed HEP based on: Other Objective/Functional Measures:    Right thumb opposition to SF PIPJ. Began stretch and strengthening exercises.      Post Treatment Pain Level (on 0 to 10) scale:   sore  / 10     ASSESSMENT  Assessment/Changes in Function:     No pain after exercises. Improving mobility right hand     []  See Progress Note/Recertification   Patient will continue to benefit from skilled OT services to address ROM deficits and address strength deficits to attain remaining goals.    Progress toward goals / Updated goals:    Met pain goal     PLAN  [x]  Upgrade activities as tolerated YES Continue plan of care   []  Discharge due to :    []  Other:      Therapist: JORDANA Herring    Date: 5/24/2018 Time: 4:11 PM

## 2018-05-31 ENCOUNTER — HOSPITAL ENCOUNTER (OUTPATIENT)
Dept: PHYSICAL THERAPY | Age: 63
Discharge: HOME | End: 2018-05-31
Payer: COMMERCIAL

## 2018-05-31 PROCEDURE — 97110 THERAPEUTIC EXERCISES: CPT

## 2018-05-31 PROCEDURE — 97018 PARAFFIN BATH THERAPY: CPT

## 2018-05-31 NOTE — PROGRESS NOTES
OCCUPATIONAL THERAPY - DAILY TREATMENT NOTE    Patient Name: Víctor Parsons        Date: 2018  : 1955   YES Patient  Verified  Visit #:   3   of   8  Insurance: Payor: Ck Alex / Plan: [address] [place] [address] [employer] [place] [employer] / Product Type: HMO /      In time: 10:35 Out time: 11:05   Total Treatment Time: 30     TREATMENT AREA =  Right thumb    SUBJECTIVE    Pain Level (on 0 to 10 scale):  0  / 10   Medication Changes/New allergies or changes in medical history, any new surgeries or procedures? NO    If yes, update Summary List   Subjective Functional Status/Changes:  []  No changes reported     I'm moving my thumb so much better and wearing the splint less. OBJECTIVE    Modalities Rationale:     increase tissue extensibility to improve patient's ability to use right hand with all tasks    min [] Estim, type/location:                                      []  att     []  unatt     []  w/US     []  w/ice    []  w/heat    min []  Ultrasound, settings/location:      min []  Iontophoresis w/ dexamethasone, location:                                               []  take home patch       []  in clinic    min []  Ice     []  Heat    location/position:    15 min [x]  Paraffin:  location Right hand with heat    min []  Vasopneumatic Device, press/temp:     min []  Other:    [x] Skin assessment post-treatment (if applicable):    [x]  intact    []  redness- no adverse reaction     []redness - adverse reaction:        15 min Therapeutic Exercise:  [x]  See flow sheet   Rationale:      increase ROM and increase strength to improve the patients ability to work without splint      min Patient Education:  YES  Reviewed HEP   [x]  Progressed/Changed HEP based on:   + green putty for  and thumb exercises     Other Objective/Functional Measures:    She now has opposition to small finger base. She is weaning off the thumb spica. Advised to not wear at home at all.       Post Treatment Pain Level (on 0 to 10) scale:   0  / 10 ASSESSMENT  Assessment/Changes in Function:     Doing well with AROM, needs strength right hand. []  See Progress Note/Recertification   Patient will continue to benefit from skilled OT services to address ROM deficits and address strength deficits to attain remaining goals.    Progress toward goals / Updated goals:    Met pain goal      PLAN  [x]  Upgrade activities as tolerated YES Continue plan of care   []  Discharge due to :    []  Other:      Therapist: CHRISTIANO Carty/L    Date: 5/31/2018 Time: 1:32 PM

## 2018-06-07 ENCOUNTER — HOSPITAL ENCOUNTER (OUTPATIENT)
Dept: PHYSICAL THERAPY | Age: 63
Discharge: HOME | End: 2018-06-07
Payer: COMMERCIAL

## 2018-06-07 PROCEDURE — 97110 THERAPEUTIC EXERCISES: CPT

## 2018-06-14 ENCOUNTER — HOSPITAL ENCOUNTER (OUTPATIENT)
Dept: PHYSICAL THERAPY | Age: 63
Discharge: HOME | End: 2018-06-14
Payer: COMMERCIAL

## 2018-06-14 PROCEDURE — 97110 THERAPEUTIC EXERCISES: CPT

## 2018-06-14 NOTE — PROGRESS NOTES
OCCUPATIONAL THERAPY - DAILY TREATMENT NOTE    Patient Name: Alex Bains        Date: 2018  : 1955   YES Patient  Verified  Visit #:   5   of   8  Insurance: Payor: Zeinab Morris / Plan: [employer]MO / Product Type: HMO /      In time: 9:00 Out time: 9:30   Total Treatment Time: 30     TREATMENT AREA =  Right thumb    SUBJECTIVE    Pain Level (on 0 to 10 scale):  0  / 10   Medication Changes/New allergies or changes in medical history, any new surgeries or procedures? NO    If yes, update Summary List   Subjective Functional Status/Changes:  []  No changes reported     I haven't worn the splint in a week. OBJECTIVE    30 min Therapeutic Exercise:  [x]  See flow sheet   Rationale:      increase ROM and increase strength to improve the patients ability to work       min Patient Education:  YES  Reviewed HEP   []  Progressed/Changed HEP based on: Other Objective/Functional Measures:    AROM right wrist and thumb is normal. Right  40#. She has full opposition to palm. She reports Ind with all work tasks. Post Treatment Pain Level (on 0 to 10) scale:   0  / 10     ASSESSMENT  Assessment/Changes in Function:     Goals met, doing well. [x]  See Progress Note/Recertification   Patient will continue to benefit from skilled OT services to na   Progress toward goals / Updated goals:    Goals met.      PLAN  []  Upgrade activities as tolerated NO Continue plan of care   [x]  Discharge due to : Goals met   []  Other:      Therapist: CHRISTIANO Hatch/HERNESTO    Date: 2018 Time: 8:59 AM

## 2018-06-14 NOTE — PROGRESS NOTES
[address] PHYSICAL THERAPY  [address] [place] [address] Karlene Aguilar, [address] - Phone: [phone]  Fax: [phone]  [address] [place] [address] THERAPY          Patient Name: Peter Penny : 1955   Medical/Treatment Diagnosis: Thumb pain, right [W71.991]   Onset Date: 2018    Referral Source: Jaclyn Oh DO Start of Care [facility]): 18   Prior Hospitalization: See Medical History Provider #: [id number]   Prior Level of Function: Ind   Comorbidities: na   Medications: Verified on Patient Summary List   Visits from [facility]: 5 Missed Visits: 0       Goal/Measure of Progress Goal Met? 1. Decrease pain 2/10 with ADLs   Status at last Eval: 2-7/10 Current Status: 0 yes   2. Normal wrist AROM for ADLs   Status at last Eval: Flex 0-50  Ext 0-55 Current Status: Normal 0-60 yes   3. Full thumb opposition to manipulate coins    Status at last Eval: Unable  Current Status: Ind with normal opposition  yes   4. Increase right  5-10# for IADLs   Status at last Eval: 18# Current Status: 40# yes     Key Functional Changes/Progress: Patient has normal AROM right wrist and hand with good strength. She is able to lift and carry 17# without difficulty and reports Ind with all job tasks. She is no longer using her thumb splint. G-Codes (GO): na  Assessments/Recommendations: Discontinue therapy. Progressing towards or have reached established goals. If you have any questions/comments please contact us directly at [phone]. Thank you for allowing us to assist in the care of your patient. Therapist Signature: CHRISTIANO Kraus/L Date: 18   Reporting Period: 18-18 Time: 9:44 AM       NOTE TO PHYSICIAN:  PLEASE COMPLETE THE ORDERS BELOW AND FAX TO   [facility]: [phone].   If you are unable to process this request in 24 hours please contact our office: [phone].    ___ I have read the above report and request that my patient be discharged from therapy.      Physician Signature:        Date:       Time:

## 2018-06-21 ENCOUNTER — APPOINTMENT (OUTPATIENT)
Dept: PHYSICAL THERAPY | Age: 63
End: 2018-06-21
Payer: COMMERCIAL

## 2018-06-28 ENCOUNTER — APPOINTMENT (OUTPATIENT)
Dept: PHYSICAL THERAPY | Age: 63
End: 2018-06-28
Payer: COMMERCIAL

## 2019-01-31 ENCOUNTER — OFFICE VISIT (OUTPATIENT)
Dept: INTERNAL MEDICINE CLINIC | Age: 64
End: 2019-01-31

## 2019-01-31 VITALS
OXYGEN SATURATION: 99 % | WEIGHT: 181 LBS | HEART RATE: 72 BPM | RESPIRATION RATE: 16 BRPM | BODY MASS INDEX: 33.31 KG/M2 | HEIGHT: 62 IN | SYSTOLIC BLOOD PRESSURE: 135 MMHG | DIASTOLIC BLOOD PRESSURE: 97 MMHG | TEMPERATURE: 96.6 F

## 2019-01-31 DIAGNOSIS — R21 RASH: ICD-10-CM

## 2019-01-31 DIAGNOSIS — M25.561 CHRONIC PAIN OF RIGHT KNEE: Primary | ICD-10-CM

## 2019-01-31 DIAGNOSIS — G89.29 CHRONIC PAIN OF RIGHT KNEE: Primary | ICD-10-CM

## 2019-01-31 RX ORDER — NYSTATIN 100000 U/G
CREAM TOPICAL 2 TIMES DAILY
Qty: 15 G | Refills: 0 | Status: SHIPPED | OUTPATIENT
Start: 2019-01-31 | End: 2020-02-03

## 2019-01-31 RX ORDER — HYDROCODONE BITARTRATE AND ACETAMINOPHEN 5; 325 MG/1; MG/1
TABLET ORAL
COMMUNITY
Start: 2016-11-19 | End: 2020-02-03

## 2019-01-31 NOTE — LETTER
NOTIFICATION RETURN TO WORK / SCHOOL 
 
1/31/2019 9:34 AM 
 
Ms. Autumn ERIC Coaxum [address] [place] [address] [phone] To Whom It May Concern: 
 
Elvis Morfin is currently under the care of Coretta Sosa. She will return to work/school on: 2/5/2019. If there are questions or concerns please have the patient contact our office. Sincerely, Perri Billy NP

## 2019-01-31 NOTE — PROGRESS NOTES
HISTORY OF PRESENT ILLNESS  Vanessa Elizabeth is a 59 y.o. female. Knee Pain   The history is provided by the patient. This is a recurrent problem. The current episode started yesterday. The problem occurs constantly. The problem has not changed since onset. Pertinent negatives include no chest pain, no headaches and no shortness of breath. The symptoms are aggravated by bending. The symptoms are relieved by medications, acetaminophen and NSAIDs. She has tried acetaminophen for the symptoms. The treatment provided significant relief. Rash    The history is provided by the patient. This is a new problem. The current episode started 2 days ago. The problem has not changed since onset. The problem is associated with an unknown factor. There has been no fever. The rash is present on the groin. The pain is at a severity of 1/10. The pain is mild. The pain has been constant since onset. Associated symptoms include pain. Pertinent negatives include no blisters, no itching, no weeping and no hives. She has tried nothing for the symptoms. The treatment provided no relief. Review of Systems   Constitutional: Negative for chills and fever. HENT: Negative for sinus pain. Eyes: Negative for blurred vision and double vision. Respiratory: Negative for cough and shortness of breath. Cardiovascular: Negative for chest pain. Gastrointestinal: Negative for heartburn, nausea and vomiting. Genitourinary: Negative for dysuria, frequency and urgency. Musculoskeletal: Positive for joint pain. Negative for back pain, myalgias and neck pain. Skin: Positive for rash. Negative for itching. Neurological: Negative for dizziness and headaches. Psychiatric/Behavioral: The patient is not nervous/anxious. Physical Exam   Constitutional: She is oriented to person, place, and time. She appears well-developed and well-nourished. No distress. HENT:   Head: Normocephalic and atraumatic.    Mouth/Throat: Oropharynx is clear and moist.   Eyes: Pupils are equal, round, and reactive to light. Cardiovascular: Regular rhythm. Pulmonary/Chest: Breath sounds normal.   Musculoskeletal:        Right knee: She exhibits normal range of motion, no swelling, no deformity, no laceration, no erythema, normal alignment, normal patellar mobility and no bony tenderness. No tenderness found. Neurological: She is alert and oriented to person, place, and time. No cranial nerve deficit. Skin: Skin is dry. Psychiatric: She has a normal mood and affect. Nursing note and vitals reviewed. ASSESSMENT and PLAN    ICD-10-CM ICD-9-CM    1. Chronic pain of right knee M25.561 719.46     G89.29 338.29    2. Rash R21 782.1 nystatin (MYCOSTATIN) topical cream   patient cleans buildings for living, she is advised to avoid sitting on knees, advised to put on padding over knee joints, wear shock absorbant shoes, also advised to rest, ice elevation and compression. Blood pressure need to monitored regularly.  For rash advised to keep area clean and apply cream.

## 2019-01-31 NOTE — PROGRESS NOTES
ROOM # 1    Autumn Elizabeth presents today for   Chief Complaint   Patient presents with    Knee Pain     right     LOW BACK PAIN       Autumn Elizabeth preferred language for health care discussion is english/other. Is someone accompanying this pt? Yes    Is the patient using any DME equipment during OV? No    Depression Screening:  PHQ over the last two weeks 1/31/2019 1/25/2018 9/24/2014   Little interest or pleasure in doing things Not at all Not at all Not at all   Feeling down, depressed, irritable, or hopeless Not at all Not at all Not at all   Total Score PHQ 2 0 0 0       Learning Assessment:  Learning Assessment 9/24/2014   PRIMARY LEARNER Patient   PRIMARY LANGUAGE ENGLISH   LEARNER PREFERENCE PRIMARY DEMONSTRATION   ANSWERED BY patient   RELATIONSHIP SELF       Abuse Screening:  Abuse Screening Questionnaire 9/24/2014   Do you ever feel afraid of your partner? N   Are you in a relationship with someone who physically or mentally threatens you? N   Is it safe for you to go home? Y       Fall Risk  N/I    Health Maintenance reviewed and discussed per provider. Yes    Jon Barrow is due for   Health Maintenance Due   Topic Date Due    DTaP/Tdap/Td series (1 - Tdap) 12/17/1977    Shingrix Vaccine Age 50> (1 of 2) 12/17/2006    FOBT Q 1 YEAR AGE 50-75  12/17/2006    PAP AKA CERVICAL CYTOLOGY  09/04/2017    Influenza Age 9 to Adult  08/01/2018     Please order/place referral if appropriate. Advance Directive:  1. Do you have an advance directive in place? Patient Reply: No    2. If not, would you like material regarding how to put one in place? Patient Reply: No    Coordination of Care:  1. Have you been to the ER, urgent care clinic since your last visit? Hospitalized since your last visit? No    2. Have you seen or consulted any other health care providers outside of the [address] [place] [address] since your last visit? Include any  colon screening.  No

## 2019-01-31 NOTE — PATIENT INSTRUCTIONS

## 2019-02-14 ENCOUNTER — OFFICE VISIT (OUTPATIENT)
Dept: INTERNAL MEDICINE CLINIC | Age: 64
End: 2019-02-14

## 2019-02-14 VITALS
BODY MASS INDEX: 32.57 KG/M2 | DIASTOLIC BLOOD PRESSURE: 96 MMHG | TEMPERATURE: 96.3 F | RESPIRATION RATE: 18 BRPM | OXYGEN SATURATION: 99 % | WEIGHT: 177 LBS | HEIGHT: 62 IN | SYSTOLIC BLOOD PRESSURE: 137 MMHG | HEART RATE: 69 BPM

## 2019-02-14 DIAGNOSIS — I10 ESSENTIAL HYPERTENSION: Primary | ICD-10-CM

## 2019-02-14 DIAGNOSIS — M25.561 CHRONIC PAIN OF RIGHT KNEE: ICD-10-CM

## 2019-02-14 DIAGNOSIS — G89.29 CHRONIC PAIN OF RIGHT KNEE: ICD-10-CM

## 2019-02-14 RX ORDER — IBUPROFEN 200 MG
600 TABLET ORAL
COMMUNITY
End: 2020-02-03

## 2019-02-14 RX ORDER — IBUPROFEN 600 MG/1
600 TABLET ORAL
Qty: 90 TAB | Refills: 0 | Status: SHIPPED | OUTPATIENT
Start: 2019-02-14 | End: 2019-03-16

## 2019-02-14 RX ORDER — HYDROCHLOROTHIAZIDE 25 MG/1
25 TABLET ORAL DAILY
Qty: 30 TAB | Refills: 2 | Status: SHIPPED | OUTPATIENT
Start: 2019-02-14 | End: 2019-05-02 | Stop reason: SDUPTHER

## 2019-02-14 NOTE — PATIENT INSTRUCTIONS
DASH Diet: Care Instructions  Your Care Instructions    The DASH diet is an eating plan that can help lower your blood pressure. DASH stands for Dietary Approaches to Stop Hypertension. Hypertension is high blood pressure. The DASH diet focuses on eating foods that are high in calcium, potassium, and magnesium. These nutrients can lower blood pressure. The foods that are highest in these nutrients are fruits, vegetables, low-fat dairy products, nuts, seeds, and legumes. But taking calcium, potassium, and magnesium supplements instead of eating foods that are high in those nutrients does not have the same effect. The DASH diet also includes whole grains, fish, and poultry. The DASH diet is one of several lifestyle changes your doctor may recommend to lower your high blood pressure. Your doctor may also want you to decrease the amount of sodium in your diet. Lowering sodium while following the DASH diet can lower blood pressure even further than just the DASH diet alone. Follow-up care is a key part of your treatment and safety. Be sure to make and go to all appointments, and call your doctor if you are having problems. It's also a good idea to know your test results and keep a list of the medicines you take. How can you care for yourself at home? Following the DASH diet  · Eat 4 to 5 servings of fruit each day. A serving is 1 medium-sized piece of fruit, ½ cup chopped or canned fruit, 1/4 cup dried fruit, or 4 ounces (½ cup) of fruit juice. Choose fruit more often than fruit juice. · Eat 4 to 5 servings of vegetables each day. A serving is 1 cup of lettuce or raw leafy vegetables, ½ cup of chopped or cooked vegetables, or 4 ounces (½ cup) of vegetable juice. Choose vegetables more often than vegetable juice. · Get 2 to 3 servings of low-fat and fat-free dairy each day. A serving is 8 ounces of milk, 1 cup of yogurt, or 1 ½ ounces of cheese. · Eat 6 to 8 servings of grains each day.  A serving is 1 slice of bread, 1 ounce of dry cereal, or ½ cup of cooked rice, pasta, or cooked cereal. Try to choose whole-grain products as much as possible. · Limit lean meat, poultry, and fish to 2 servings each day. A serving is 3 ounces, about the size of a deck of cards. · Eat 4 to 5 servings of nuts, seeds, and legumes (cooked dried beans, lentils, and split peas) each week. A serving is 1/3 cup of nuts, 2 tablespoons of seeds, or ½ cup of cooked beans or peas. · Limit fats and oils to 2 to 3 servings each day. A serving is 1 teaspoon of vegetable oil or 2 tablespoons of salad dressing. · Limit sweets and added sugars to 5 servings or less a week. A serving is 1 tablespoon jelly or jam, ½ cup sorbet, or 1 cup of lemonade. · Eat less than 2,300 milligrams (mg) of sodium a day. If you limit your sodium to 1,500 mg a day, you can lower your blood pressure even more. Tips for success  · Start small. Do not try to make dramatic changes to your diet all at once. You might feel that you are missing out on your favorite foods and then be more likely to not follow the plan. Make small changes, and stick with them. Once those changes become habit, add a few more changes. · Try some of the following:  ? Make it a goal to eat a fruit or vegetable at every meal and at snacks. This will make it easy to get the recommended amount of fruits and vegetables each day. ? Try yogurt topped with fruit and nuts for a snack or healthy dessert. ? Add lettuce, tomato, cucumber, and onion to sandwiches. ? Combine a ready-made pizza crust with low-fat mozzarella cheese and lots of vegetable toppings. Try using tomatoes, squash, spinach, broccoli, carrots, cauliflower, and onions. ? Have a variety of cut-up vegetables with a low-fat dip as an appetizer instead of chips and dip. ? Sprinkle sunflower seeds or chopped almonds over salads. Or try adding chopped walnuts or almonds to cooked vegetables.   ? Try some vegetarian meals using beans and peas. Add garbanzo or kidney beans to salads. Make burritos and tacos with mashed grubbs beans or black beans. Where can you learn more? Go to http://joelle-mariel.info/. Enter X084 in the search box to learn more about \"DASH Diet: Care Instructions. \"  Current as of: July 22, 2018  Content Version: 11.9  © 7640-8540 [employer]. Care instructions adapted under license by "[employer] (which disclaims liability or warranty for this information). If you have questions about a medical condition or this instruction, always ask your healthcare professional. [address] any warranty or liability for your use of this information.

## 2019-02-14 NOTE — PROGRESS NOTES
ROOM # 9    Autumn ERIC Coaxum presents today for   Chief Complaint   Patient presents with    Knee Pain     follow up       [address] preferred language for health care discussion is english/other. Is someone accompanying this pt? no    Is the patient using any DME equipment during [address]? no    Depression Screening:  3 most recent PHQ Screens 1/31/2019 1/25/2018 9/24/2014   Little interest or pleasure in doing things Not at all Not at all Not at all   Feeling down, depressed, irritable, or hopeless Not at all Not at all Not at all   Total Score PHQ 2 0 0 0       Learning Assessment:  Learning Assessment 9/24/2014   PRIMARY LEARNER Patient   PRIMARY LANGUAGE ENGLISH   LEARNER PREFERENCE PRIMARY DEMONSTRATION   ANSWERED BY patient   RELATIONSHIP SELF       Abuse Screening:  Abuse Screening Questionnaire 9/24/2014   Do you ever feel afraid of your partner? N   Are you in a relationship with someone who physically or mentally threatens you? N   Is it safe for you to go home? Y       Fall Risk  No flowsheet data found. Health Maintenance reviewed and discussed per provider. Yes    [address] is due for   Health Maintenance Due   Topic Date Due    DTaP/Tdap/Td series (1 - Tdap) 12/17/1977    Shingrix Vaccine Age 50> (1 of 2) 12/17/2006    FOBT Q 1 YEAR AGE 50-75  12/17/2006    PAP AKA CERVICAL CYTOLOGY  09/04/2017    Influenza Age 9 to Adult  08/01/2018         Please order/place referral if appropriate. Advance Directive:  1. Do you have an advance directive in place? Patient Reply: no    2. If not, would you like material regarding how to put one in place? Patient Reply: no    Coordination of Care:  1. Have you been to the ER, urgent care clinic since your last visit? Hospitalized since your last visit? no    2. Have you seen or consulted any other health care providers outside of the [address] [place] [address] since your last visit? Include any pap smears or colon screening.  no

## 2019-02-14 NOTE — PROGRESS NOTES
HISTORY OF PRESENT ILLNESS  Ena Elizabeth is a 59 y.o. female. Knee Pain   The history is provided by the patient. This is a chronic problem. The problem occurs constantly. The problem has been gradually improving. Pertinent negatives include no chest pain, no abdominal pain, no headaches and no shortness of breath. Nothing aggravates the symptoms. The symptoms are relieved by NSAIDs. Treatments tried: ibuprofen. The treatment provided significant relief. Review of Systems   Constitutional: Negative for chills, fever and malaise/fatigue. HENT: Negative for congestion, sinus pain and sore throat. Eyes: Negative for blurred vision and double vision. Respiratory: Negative for cough, shortness of breath and wheezing. Cardiovascular: Negative for chest pain and palpitations. Gastrointestinal: Negative for abdominal pain, heartburn, nausea and vomiting. Musculoskeletal: Negative for back pain, myalgias and neck pain. Neurological: Negative for dizziness and headaches. Psychiatric/Behavioral: The patient is not nervous/anxious. Physical Exam   Constitutional: She is oriented to person, place, and time. She appears well-developed and well-nourished. No distress. HENT:   Head: Normocephalic and atraumatic. Mouth/Throat: Oropharynx is clear and moist. No oropharyngeal exudate. Eyes: EOM are normal. Pupils are equal, round, and reactive to light. Neck: Neck supple. Cardiovascular: Regular rhythm. Pulmonary/Chest: Breath sounds normal. No respiratory distress. She has no wheezes. Musculoskeletal:        Right knee: She exhibits normal range of motion, no swelling and normal patellar mobility. No tenderness found. Neurological: She is alert and oriented to person, place, and time. No cranial nerve deficit. Skin: Skin is dry. Psychiatric: She has a normal mood and affect. Nursing note and vitals reviewed. ASSESSMENT and PLAN    ICD-10-CM ICD-9-CM    1.  Essential hypertension I10 401.9 hydroCHLOROthiazide (HYDRODIURIL) 25 mg tablet   2.  Chronic pain of right knee M25.561 719.46 ibuprofen (MOTRIN) 600 mg tablet    G89.29 338.29

## 2019-05-02 DIAGNOSIS — I10 ESSENTIAL HYPERTENSION: ICD-10-CM

## 2019-05-10 RX ORDER — HYDROCHLOROTHIAZIDE 25 MG/1
25 TABLET ORAL DAILY
Qty: 90 TAB | Refills: 0 | Status: SHIPPED | OUTPATIENT
Start: 2019-05-10 | End: 2020-02-03

## 2019-05-28 DIAGNOSIS — I10 ESSENTIAL HYPERTENSION: ICD-10-CM

## 2019-05-28 NOTE — TELEPHONE ENCOUNTER
Pharmacy fax request, last OV 2/14/19, no future appts scheduled. Requested Prescriptions     Pending Prescriptions Disp Refills    hydroCHLOROthiazide (HYDRODIURIL) 25 mg tablet 90 Tab 0     Sig: Take 1 Tab by mouth daily. Due for lab and blood pressure appointment. Please schedule for future refills.

## 2019-05-29 RX ORDER — HYDROCHLOROTHIAZIDE 25 MG/1
25 TABLET ORAL DAILY
Qty: 90 TAB | Refills: 0 | OUTPATIENT
Start: 2019-05-29

## 2020-02-03 ENCOUNTER — APPOINTMENT (OUTPATIENT)
Dept: GENERAL RADIOLOGY | Age: 65
End: 2020-02-03
Attending: EMERGENCY MEDICINE
Payer: COMMERCIAL

## 2020-02-03 ENCOUNTER — HOSPITAL ENCOUNTER (EMERGENCY)
Age: 65
Discharge: HOME | End: 2020-02-03
Attending: EMERGENCY MEDICINE | Admitting: EMERGENCY MEDICINE
Payer: COMMERCIAL

## 2020-02-03 VITALS
RESPIRATION RATE: 18 BRPM | HEIGHT: 61 IN | BODY MASS INDEX: 33.44 KG/M2 | SYSTOLIC BLOOD PRESSURE: 129 MMHG | HEART RATE: 59 BPM | OXYGEN SATURATION: 99 % | TEMPERATURE: 97.6 F | DIASTOLIC BLOOD PRESSURE: 66 MMHG

## 2020-02-03 DIAGNOSIS — S39.012A LUMBAR STRAIN, INITIAL ENCOUNTER: Primary | ICD-10-CM

## 2020-02-03 PROCEDURE — 74011250636 HC RX REV CODE- 250/636: Performed by: EMERGENCY MEDICINE

## 2020-02-03 PROCEDURE — 72110 X-RAY EXAM L-2 SPINE 4/>VWS: CPT

## 2020-02-03 PROCEDURE — 74011250637 HC RX REV CODE- 250/637: Performed by: EMERGENCY MEDICINE

## 2020-02-03 PROCEDURE — 99284 EMERGENCY DEPT VISIT MOD MDM: CPT

## 2020-02-03 PROCEDURE — 74011000250 HC RX REV CODE- 250: Performed by: EMERGENCY MEDICINE

## 2020-02-03 RX ORDER — METHOCARBAMOL 750 MG/1
750 TABLET, FILM COATED ORAL 4 TIMES DAILY
COMMUNITY
End: 2020-02-05 | Stop reason: ALTCHOICE

## 2020-02-03 RX ORDER — LIDOCAINE 4 G/100G
1 PATCH TOPICAL EVERY 24 HOURS
Status: DISCONTINUED | OUTPATIENT
Start: 2020-02-03 | End: 2020-02-03 | Stop reason: HOSPADM

## 2020-02-03 RX ORDER — IBUPROFEN 800 MG/1
800 TABLET ORAL
COMMUNITY
End: 2020-11-27

## 2020-02-03 RX ORDER — HYDROCODONE BITARTRATE AND ACETAMINOPHEN 5; 325 MG/1; MG/1
1 TABLET ORAL
Status: COMPLETED | OUTPATIENT
Start: 2020-02-03 | End: 2020-02-03

## 2020-02-03 RX ORDER — TRAMADOL HYDROCHLORIDE 50 MG/1
50 TABLET ORAL
Qty: 4 TAB | Refills: 0 | Status: SHIPPED | OUTPATIENT
Start: 2020-02-03 | End: 2020-02-05 | Stop reason: ALTCHOICE

## 2020-02-03 RX ORDER — DEXAMETHASONE 4 MG/1
10 TABLET ORAL
Status: COMPLETED | OUTPATIENT
Start: 2020-02-03 | End: 2020-02-03

## 2020-02-03 RX ADMIN — DEXAMETHASONE 10 MG: 4 TABLET ORAL at 08:09

## 2020-02-03 RX ADMIN — HYDROCODONE BITARTRATE AND ACETAMINOPHEN 1 TABLET: 5; 325 TABLET ORAL at 08:09

## 2020-02-03 NOTE — ED PROVIDER NOTES
EMERGENCY DEPARTMENT HISTORY AND PHYSICAL EXAM    7:20 AM      Date: 2/3/2020  Patient Name: Janine Bucio    History of Presenting Illness     Chief Complaint   Patient presents with    Back Pain         History Provided By: Patient      Additional History (Context): Janine Bucio is a 62 y.o. female who presents with lumbar pain. Patient states this started on Wednesday she did go to urgent care on Friday was given prescriptions for Robaxin and Motrin she states her last dose was at 5 AM this morning comes into the emergency department now stating that the pain has not resolved. .  Pain is worse with taking a deep breath with movement with attempting to put on her socks and shoes. Denies any bowel or bladder issues she denies any pain radiating down her legs she denies any weakness or numbness in her legs. Social history she denies alcohol or recreational drug use PCP    PCP: Nirmala Palumbo NP      Current Facility-Administered Medications   Medication Dose Route Frequency Provider Last Rate Last Dose    HYDROcodone-acetaminophen (NORCO) 5-325 mg per tablet 1 Tab  1 Tab Oral NOW Andrey Myers MD        lidocaine 4 % patch 1 Patch  1 Patch TransDERmal Q24H Andrey Myers MD        dexAMETHasone (DECADRON) tablet 10 mg  10 mg Oral NOW Andrey Myers MD         Current Outpatient Medications   Medication Sig Dispense Refill    methocarbamol (ROBAXIN-750) 750 mg tablet Take 750 mg by mouth four (4) times daily.  ibuprofen (MOTRIN) 800 mg tablet Take 800 mg by mouth every eight (8) hours as needed for Pain.  traMADol (ULTRAM) 50 mg tablet Take 1 Tab by mouth every four (4) hours as needed for Pain for up to 3 days. Max Daily Amount: 300 mg. 4 Tab 0       Past History     Past Medical History:  No past medical history on file. Past Surgical History:  No past surgical history on file.     Family History:  Family History   Problem Relation Age of Onset    Liver Disease Mother cirrohosis       Social History:  Social History     Tobacco Use    Smoking status: Former Smoker     Types: Cigarettes    Smokeless tobacco: Never Used    Tobacco comment: quit 2017   Substance Use Topics    Alcohol use: Yes     Comment: socially     Drug use: No       Allergies:  No Known Allergies      Review of Systems       Review of Systems   Constitutional: Negative for chills, diaphoresis and fever. HENT: Negative for congestion. Eyes: Negative for visual disturbance. Respiratory: Negative for cough, chest tightness and shortness of breath. Cardiovascular: Negative for chest pain. Gastrointestinal: Negative for abdominal pain, diarrhea, nausea and vomiting. Musculoskeletal: Positive for back pain. Skin: Negative for rash. Neurological: Negative for dizziness, syncope and weakness. All other systems reviewed and are negative. Physical Exam     Visit Vitals  BP (!) 154/98   Pulse (!) 59   Temp 97.6 °F (36.4 °C)   Resp 18   Ht 5' 1\" (1.549 m)   SpO2 100%   BMI 33.44 kg/m²       Physical Exam  Vitals signs and nursing note reviewed. Constitutional:       General: She is not in acute distress. Appearance: She is well-developed. HENT:      Head: Normocephalic and atraumatic. Eyes:      General: No scleral icterus. Conjunctiva/sclera: Conjunctivae normal.      Pupils: Pupils are equal, round, and reactive to light. Neck:      Musculoskeletal: Normal range of motion and neck supple. Cardiovascular:      Rate and Rhythm: Normal rate and regular rhythm. Heart sounds: Normal heart sounds. No murmur. Pulmonary:      Effort: Pulmonary effort is normal. No respiratory distress. Breath sounds: Normal breath sounds. Abdominal:      General: Bowel sounds are normal. There is no distension. Palpations: Abdomen is soft. Tenderness: There is no abdominal tenderness.    Musculoskeletal:      Lumbar back: She exhibits decreased range of motion and tenderness. She exhibits no bony tenderness, no swelling, no edema and no deformity. Comments: Paraspinal muscular tenderness dramatic presentation. Negative straight leg raise bilaterally sensation is intact pulses intact   Lymphadenopathy:      Cervical: No cervical adenopathy. Skin:     General: Skin is warm and dry. Findings: No rash. Neurological:      Mental Status: She is alert and oriented to person, place, and time. Coordination: Coordination normal.   Psychiatric:         Behavior: Behavior normal.           Diagnostic Study Results     Labs -  No results found for this or any previous visit (from the past 12 hour(s)). Radiologic Studies -   XR SPINE LUMB MIN 4 V    (Results Pending)         Medical Decision Making   I am the first provider for this patient. I reviewed the vital signs, available nursing notes, past medical history, past surgical history, family history and social history. Vital Signs-Reviewed the patient's vital signs. EKG:    Records Reviewed: Nursing Notes and Old Medical Records (Time of Review: 7:20 AM)    ED Course: Progress Notes, Reevaluation, and Consults:      Provider Notes (Medical Decision Making):   MDM  Number of Diagnoses or Management Options  Lumbar strain, initial encounter:   Diagnosis management comments: Lumbar strain not responsive to Motrin and Robaxin will obtain imaging medicate case management for medical follow-up    8:08 AM  Xray neg in ED will give small dose of Ultram as patient is currently on Motrin with no assistance DC home       Amount and/or Complexity of Data Reviewed  Tests in the radiology section of CPT®: ordered and reviewed            Critical Care Time:       Diagnosis     Clinical Impression:   1.  Lumbar strain, initial encounter        Disposition: Home    Follow-up Information     Follow up With Specialties Details Why [facility] EMERGENCY DEPT Emergency Medicine  As needed, If symptoms worsen [address] [facility].  [phone]    Aurea Reynolds NP Nurse Practitioner Schedule an appointment as soon as possible for a visit for ED follow up appointment [address]             Patient's Medications   Start Taking    TRAMADOL (ULTRAM) 50 MG TABLET    Take 1 Tab by mouth every four (4) hours as needed for Pain for up to 3 days. Max Daily Amount: 300 mg. Continue Taking    IBUPROFEN (MOTRIN) 800 MG TABLET    Take 800 mg by mouth every eight (8) hours as needed for Pain. METHOCARBAMOL (ROBAXIN-750) 750 MG TABLET    Take 750 mg by mouth four (4) times daily. These Medications have changed    No medications on file   Stop Taking    HYDROCHLOROTHIAZIDE (HYDRODIURIL) 25 MG TABLET    Take 1 Tab by mouth daily. Due for lab and blood pressure appointment. Please schedule for future refills. HYDROCODONE-ACETAMINOPHEN (NORCO) 5-325 MG PER TABLET    Take 1 Tab by Mouth Every 4 Hours As Needed for Pain. IBUPROFEN (MOTRIN IB) 200 MG TABLET    Take 600 mg by mouth every eight (8) hours as needed for Pain. NAPROXEN (NAPROSYN) 500 MG TABLET    Take 1 Tab by mouth two (2) times daily (with meals). NYSTATIN (MYCOSTATIN) TOPICAL CREAM    Apply  to affected area two (2) times a day.     _______________________________    Please note that this dictation was completed with [employer], the computer voice recognition software. Quite often unanticipated grammatical, syntax, homophones, and other interpretive errors are inadvertently transcribed by the computer software. Please disregard these errors. Please excuse any errors that have escaped final proofreading.

## 2020-02-03 NOTE — ED NOTES
I have reviewed discharge instructions with the patient. The patient verbalized understanding. Pt is A&OX4. Is ambulatory. All questions answered. Pt provided education on following up with a PCP for her chronic back pain issues. Pt stable at dx.

## 2020-02-03 NOTE — ED TRIAGE NOTES
Pt reports lower back pain times 4 days after lifting a case of urena. Pt states she saw her PCP on Friday however the pain is worse today. Pt states she became concerned when she was unable to put on her socks today.

## 2020-02-03 NOTE — ED NOTES
Received report from Duncan hinojosa, [facility]. Pt is sitting quietly on the bed. States she has had terrible lower back pain for a week. Denies injury. But states after picking up a case of water at work on Thursday she felt something \"pop\" in her back and the pain \"got worse after that and hasn't been the same since. \" She went to urgent care on Friday and was given a muscle relaxer and IB-profin but states it has not helped. Upon assessment, no injuries observed. Medicated pt according to [facility]. Pt is stable at this time. Will continue to monitor.

## 2020-02-03 NOTE — ED NOTES
Verbal shift change report given to Stacy (oncoming nurse) by Sultana Holt (offgoing nurse). Report included the following information SBAR, ED Summary and MAR.

## 2020-02-03 NOTE — LETTER
[address] [facility] EMERGENCY DEPT 
[address] 
[address] [facility] [id number] [phone] Work/School Note Date: 2/3/2020 To Whom It May concern: 
 
Aleah Henao was seen and treated today in the emergency room by the following provider(s): 
Attending Provider: Miguel Angel Martin MD. Autumn Elizabeth may return to work on 2/8/2020. Sincerely, Richar Flaherty MD

## 2020-02-03 NOTE — DISCHARGE INSTRUCTIONS

## 2020-02-05 ENCOUNTER — OFFICE VISIT (OUTPATIENT)
Dept: FAMILY MEDICINE CLINIC | Age: 65
End: 2020-02-05

## 2020-02-05 VITALS
HEIGHT: 61 IN | DIASTOLIC BLOOD PRESSURE: 81 MMHG | OXYGEN SATURATION: 98 % | WEIGHT: 175.4 LBS | BODY MASS INDEX: 33.12 KG/M2 | HEART RATE: 63 BPM | SYSTOLIC BLOOD PRESSURE: 138 MMHG | TEMPERATURE: 98.5 F | RESPIRATION RATE: 20 BRPM

## 2020-02-05 DIAGNOSIS — S39.012A STRAIN OF LUMBAR REGION, INITIAL ENCOUNTER: Primary | ICD-10-CM

## 2020-02-05 RX ORDER — CYCLOBENZAPRINE HCL 10 MG
10 TABLET ORAL
Qty: 30 TAB | Refills: 0 | OUTPATIENT
Start: 2020-02-05 | End: 2020-11-27

## 2020-02-05 NOTE — PATIENT INSTRUCTIONS
Acute Low Back Pain: Exercises  Your Care Instructions  Here are some examples of typical rehabilitation exercises for your condition. Start each exercise slowly. Ease off the exercise if you start to have pain. Your doctor or physical therapist will tell you when you can start these exercises and which ones will work best for you. When you are not being active, find a comfortable position for rest. Some people are comfortable on the floor or a medium-firm bed with a small pillow under their head and another under their knees. Some people prefer to lie on their side with a pillow between their knees. Don't stay in one position for too long. Take short walks (10 to 20 minutes) every 2 to 3 hours. Avoid slopes, hills, and stairs until you feel better. Walk only distances you can manage without pain, especially leg pain. How to do the exercises  Back stretches    1. Get down on your hands and knees on the floor. 2. Relax your head and allow it to droop. Round your back up toward the ceiling until you feel a nice stretch in your upper, middle, and lower back. Hold this stretch for as long as it feels comfortable, or about 15 to 30 seconds. 3. Return to the starting position with a flat back while you are on your hands and knees. 4. Let your back sway by pressing your stomach toward the floor. Lift your buttocks toward the ceiling. 5. Hold this position for 15 to 30 seconds. 6. Repeat 2 to 4 times. Low Back Pain: Exercises  Your Care Instructions  Here are some examples of typical rehabilitation exercises for your condition. Start each exercise slowly. Ease off the exercise if you start to have pain. Your doctor or physical therapist will tell you when you can start these exercises and which ones will work best for you. How to do the exercises  Press-up    1. Lie on your stomach, supporting your body with your forearms. 2. Press your elbows down into the floor to raise your upper back.  As you do this, relax your stomach muscles and allow your back to arch without using your back muscles. As your press up, do not let your hips or pelvis come off the floor. 3. Hold for 15 to 30 seconds, then relax. 4. Repeat 2 to 4 times. Alternate arm and leg (bird dog) exercise    1. Start on the floor, on your hands and knees. 2. Tighten your belly muscles. 3. Raise one leg off the floor, and hold it straight out behind you. Be careful not to let your hip drop down, because that will twist your trunk. 4. Hold for about 6 seconds, then lower your leg and switch to the other leg. 5. Repeat 8 to 12 times on each leg. 6. Over time, work up to holding for 10 to 30 seconds each time. 7. If you feel stable and secure with your leg raised, try raising the opposite arm straight out in front of you at the same time. Knee-to-chest exercise    1. Lie on your back with your knees bent and your feet flat on the floor. 2. Bring one knee to your chest, keeping the other foot flat on the floor (or keeping the other leg straight, whichever feels better on your lower back). 3. Keep your lower back pressed to the floor. Hold for at least 15 to 30 seconds. 4. Relax, and lower the knee to the starting position. 5. Repeat with the other leg. Repeat 2 to 4 times with each leg. 6. To get more stretch, put your other leg flat on the floor while pulling your knee to your chest.    Curl-ups    1. Lie on the floor on your back with your knees bent at a 90-degree angle. Your feet should be flat on the floor, about 12 inches from your buttocks. 2. Cross your arms over your chest. If this bothers your neck, try putting your hands behind your neck (not your head), with your elbows spread apart. 3. Slowly tighten your belly muscles and raise your shoulder blades off the floor.   4. Keep your head in line with your body, and do not press your chin to your chest.  5. Hold this position for 1 or 2 seconds, then slowly lower yourself back down to the floor. 6. Repeat 8 to 12 times. Pelvic tilt exercise    1. Lie on your back with your knees bent. 2. \"Brace\" your stomach. This means to tighten your muscles by pulling in and imagining your belly button moving toward your spine. You should feel like your back is pressing to the floor and your hips and pelvis are rocking back. 3. Hold for about 6 seconds while you breathe smoothly. 4. Repeat 8 to 12 times. Heel dig bridging    1. Lie on your back with both knees bent and your ankles bent so that only your heels are digging into the floor. Your knees should be bent about 90 degrees. 2. Then push your heels into the floor, squeeze your buttocks, and lift your hips off the floor until your shoulders, hips, and knees are all in a straight line. 3. Hold for about 6 seconds as you continue to breathe normally, and then slowly lower your hips back down to the floor and rest for up to 10 seconds. 4. Do 8 to 12 repetitions. Hamstring stretch in doorway    1. Lie on your back in a doorway, with one leg through the open door. 2. Slide your leg up the wall to straighten your knee. You should feel a gentle stretch down the back of your leg. 3. Hold the stretch for at least 15 to 30 seconds. Do not arch your back, point your toes, or bend either knee. Keep one heel touching the floor and the other heel touching the wall. 4. Repeat with your other leg. 5. Do 2 to 4 times for each leg. Hip flexor stretch    1. Kneel on the floor with one knee bent and one leg behind you. Place your forward knee over your foot. Keep your other knee touching the floor. 2. Slowly push your hips forward until you feel a stretch in the upper thigh of your rear leg. 3. Hold the stretch for at least 15 to 30 seconds. Repeat with your other leg. 4. Do 2 to 4 times on each side. Wall sit    1. Stand with your back 10 to 12 inches away from a wall. 2. Lean into the wall until your back is flat against it.   3. Slowly slide down until your knees are slightly bent, pressing your lower back into the wall. 4. Hold for about 6 seconds, then slide back up the wall. 5. Repeat 8 to 12 times. Follow-up care is a key part of your treatment and safety. Be sure to make and go to all appointments, and call your doctor if you are having problems. It's also a good idea to know your test results and keep a list of the medicines you take. Where can you learn more? Go to http://joelle-mariel.info/. Enter X449 in the search box to learn more about \"Low Back Pain: Exercises. \"  Current as of: September 20, 2018  Content Version: 11.9  © 7935-8690 [employer]. Care instructions adapted under license by [employer] (which disclaims liability or warranty for this information). If you have questions about a medical condition or this instruction, always ask your healthcare professional. [address] any warranty or liability for your use of this information.

## 2020-02-05 NOTE — PROGRESS NOTES
1. Have you been to the ER, urgent care clinic since your last visit? Hospitalized since your last visit? Yes When: 2/2020 Where: [facility] Reason for visit: back pain    2. Have you seen or consulted any other health care providers outside of the [address] [place] [address] since your last visit? Include any pap smears or colon screening.  No

## 2020-02-05 NOTE — PROGRESS NOTES
Subjective     Patient ID:  Natanael Andrade is a 62 y.o. ( 1955) female who presents for the followin [facility] Follow Up      HPI   Presents with bilateral lower back pain without radiculopathy which started 1 week ago. She had lifted a case of water at work. She works as a . Course: No change. Went to urgent care last Friday and was started on ibuprofen and methocarbamol which she states did not help. Went to ER 2 days ago and was given tramadol which did help temporarily. Pain is worse when she gets up in the morning. Somewhat better with standing and walking. X-ray done in the ER showed spondylosis. Review of Systems   Constitutional: Negative for fatigue and fever. HENT: Negative for congestion and sore throat. Respiratory: Negative for cough. Cardiovascular: Negative for chest pain and palpitations. Genitourinary: Negative for dysuria. Musculoskeletal: Positive for back pain. Negative for gait problem. Neurological: Negative for dizziness, weakness, numbness and headaches. Past Medical History, Past Surgery History, Allergies, Social History, and Family History were reviewed and updated. Patient Active Problem List   Diagnosis Code    Postmenopausal state Z78.0    Dyspareunia [id number]     No past medical history on file.   Past Surgical History:   Procedure Laterality Date    HX CHOLECYSTECTOMY      HX TUBAL LIGATION       Family History   Problem Relation Age of Onset    Liver Disease Mother         cirrohosis     Social History     Tobacco Use    Smoking status: Former Smoker     Types: Cigarettes    Smokeless tobacco: Never Used    Tobacco comment: quit 2017   Substance Use Topics    Alcohol use: Yes     Comment: socially     Drug use: No     No Known Allergies  Current Outpatient Medications on File Prior to Visit   Medication Sig Dispense Refill    ibuprofen (MOTRIN) 800 mg tablet Take 800 mg by mouth every eight (8) hours as needed for Pain.  [DISCONTINUED] methocarbamol (ROBAXIN-750) 750 mg tablet Take 750 mg by mouth four (4) times daily.  [DISCONTINUED] traMADol (ULTRAM) 50 mg tablet Take 1 Tab by mouth every four (4) hours as needed for Pain for up to 3 days. Max Daily Amount: 300 mg. 4 Tab 0     No current facility-administered medications on file prior to visit. Objective     Visit Vitals  /81   Pulse 63   Temp 98.5 °F (36.9 °C) (Oral)   Resp 20   Ht 5' 1\" (1.549 m)   Wt 175 lb 6.4 oz (79.6 kg)   SpO2 98%   BMI 33.14 kg/m²     No LMP recorded. (Menstrual status: Menopause). Physical Exam  Constitutional:       General: She is not in acute distress. Appearance: She is well-developed. She is not diaphoretic. Cardiovascular:      Rate and Rhythm: Normal rate and regular rhythm. Heart sounds: Normal heart sounds. No murmur. Pulmonary:      Effort: Pulmonary effort is normal. No respiratory distress. Breath sounds: Normal breath sounds. Musculoskeletal:      Lumbar back: She exhibits tenderness (Bilateral lumbar paraspinal musculature) and pain (With movement, worst with lateral rotation). She exhibits normal range of motion, no bony tenderness, no swelling, no edema, no deformity, no laceration and no spasm. Neurological:      Mental Status: She is alert and oriented to person, place, and time. Sensory: No sensory deficit. Gait: Gait normal.      Deep Tendon Reflexes: Reflexes are normal and symmetric. LABS     TESTS      Assessment and Plan     1. Strain of lumbar region, initial encounter  Stop methocarbamol and tramadol. May continue ibuprofen alternating with Tylenol as needed. Start Flexeril. Advised not to drive or operate machinery after taking it. May cause somnolence. Use topical heat/ice. Instructions for stretches given. Follow-up if symptoms continue. - cyclobenzaprine (FLEXERIL) 10 mg tablet;  Take 1 Tab by mouth three (3) times daily as needed for Muscle Spasm(s). Dispense: 30 Tab; Refill: 0          Risks, benefits, and alternatives of the medications and treatment plan prescribed today were discussed, and patient expressed understanding. Printed after visit summary was given to patient and reviewed. All patient questions and concerns were addressed. Plan follow-up as discussed or as needed if any worsening symptoms or change in condition.            Signed electronically by Trace Ruiz DNP, DEBORA-BC

## 2020-11-27 ENCOUNTER — HOSPITAL ENCOUNTER (EMERGENCY)
Age: 65
Discharge: HOME | End: 2020-11-27
Attending: EMERGENCY MEDICINE
Payer: MEDICAID

## 2020-11-27 VITALS
HEART RATE: 69 BPM | TEMPERATURE: 97.9 F | RESPIRATION RATE: 18 BRPM | OXYGEN SATURATION: 100 % | SYSTOLIC BLOOD PRESSURE: 149 MMHG | DIASTOLIC BLOOD PRESSURE: 96 MMHG

## 2020-11-27 DIAGNOSIS — K02.9 DENTAL CARIES INTO PULP: ICD-10-CM

## 2020-11-27 DIAGNOSIS — K02.9 PAIN DUE TO DENTAL CARIES: Primary | ICD-10-CM

## 2020-11-27 PROCEDURE — 99283 EMERGENCY DEPT VISIT LOW MDM: CPT

## 2020-11-27 PROCEDURE — 74011250637 HC RX REV CODE- 250/637: Performed by: EMERGENCY MEDICINE

## 2020-11-27 RX ORDER — IBUPROFEN 400 MG/1
800 TABLET ORAL
Status: COMPLETED | OUTPATIENT
Start: 2020-11-27 | End: 2020-11-27

## 2020-11-27 RX ORDER — AMOXICILLIN 875 MG/1
875 TABLET, FILM COATED ORAL 2 TIMES DAILY
Qty: 20 TAB | Refills: 0 | Status: SHIPPED | OUTPATIENT
Start: 2020-11-27 | End: 2020-12-07

## 2020-11-27 RX ORDER — AMOXICILLIN 250 MG/1
1000 CAPSULE ORAL
Status: COMPLETED | OUTPATIENT
Start: 2020-11-27 | End: 2020-11-27

## 2020-11-27 RX ORDER — IBUPROFEN 800 MG/1
800 TABLET ORAL
Qty: 30 TAB | Refills: 0 | Status: SHIPPED | OUTPATIENT
Start: 2020-11-27 | End: 2020-12-07

## 2020-11-27 RX ADMIN — IBUPROFEN 800 MG: 400 TABLET ORAL at 09:41

## 2020-11-27 RX ADMIN — AMOXICILLIN 1000 MG: 250 CAPSULE ORAL at 09:41

## 2020-11-27 NOTE — ED TRIAGE NOTES
Pt presents ambulatory to ED via triage from home with complaints of dental pain s/p losing filling 2 days ago.

## 2020-11-27 NOTE — ED PROVIDER NOTES
HPI   15-year-old -American female presents with a chief complaint of right lower molar dental pain. Patient states that her filling fell out approximately 2 nights ago. She states that she is having intense pain in her tooth and currently rates the pain as 10 out of 10 in intensity. She states that she went to to dentist office today which were both closed due to black Friday. Patient reports past history of tobacco use and states that she quit approximately 6 months ago. She does not have a dentist and denies any past medical history. Patient is requesting a work note for the next 2 days because she states that the pain is too intense for her to go to work until Monday. No past medical history on file.     Past Surgical History:   Procedure Laterality Date    HX CHOLECYSTECTOMY      HX TUBAL LIGATION           Family History:   Problem Relation Age of Onset    Liver Disease Mother         cirrohosis       Social History     Socioeconomic History    Marital status: SINGLE     Spouse name: Not on file    Number of children: Not on file    Years of education: Not on file    Highest education level: Not on file   Occupational History    Not on file   Social Needs    Financial resource strain: Not on file    Food insecurity     Worry: Not on file     Inability: Not on file    Transportation needs     Medical: Not on file     Non-medical: Not on file   Tobacco Use    Smoking status: Former Smoker     Types: Cigarettes    Smokeless tobacco: Never Used    Tobacco comment: quit 2017   Substance and Sexual Activity    Alcohol use: Yes     Comment: socially     Drug use: No    Sexual activity: Not Currently   Lifestyle    Physical activity     Days per week: Not on file     Minutes per session: Not on file    Stress: Not on file   Relationships    Social connections     Talks on phone: Not on file     Gets together: Not on file     Attends Muslim service: Not on file     Active member of club or organization: Not on file     Attends meetings of clubs or organizations: Not on file     Relationship status: Not on file    Intimate partner violence     Fear of current or ex partner: Not on file     Emotionally abused: Not on file     Physically abused: Not on file     Forced sexual activity: Not on file   Other Topics Concern    Not on file   Social History Narrative    Not on file         ALLERGIES: Patient has no known allergies. Review of Systems   Constitutional: Negative for chills, diaphoresis, fatigue and fever. HENT: Positive for dental problem. Negative for congestion, ear discharge, ear pain, facial swelling, hearing loss, postnasal drip, sinus pressure, sore throat and trouble swallowing. Eyes: Negative for pain, discharge and redness. Respiratory: Negative for cough, chest tightness, shortness of breath, wheezing and stridor. Cardiovascular: Negative for chest pain, palpitations and leg swelling. Gastrointestinal: Negative for abdominal pain, constipation, diarrhea, nausea and vomiting. Endocrine: Negative for polydipsia, polyphagia and polyuria. Genitourinary: Negative for difficulty urinating, dyspareunia, dysuria, flank pain, frequency, hematuria, vaginal bleeding and vaginal discharge. Musculoskeletal: Negative for arthralgias, back pain, joint swelling, myalgias, neck pain and neck stiffness. Skin: Negative for color change, rash and wound. Neurological: Negative for dizziness, seizures, syncope, weakness, light-headedness, numbness and headaches. Hematological: Negative for adenopathy. Does not bruise/bleed easily. Psychiatric/Behavioral: Negative for agitation, confusion, decreased concentration, hallucinations, sleep disturbance and suicidal ideas. The patient is not nervous/anxious. All other systems reviewed and are negative.       Vitals:    11/27/20 0925   BP: (!) 149/96   Pulse: 69   Resp: 18   Temp: 97.9 °F (36.6 °C)   SpO2: 100% Physical Exam  Vitals signs and nursing note reviewed. Constitutional:       General: She is not in acute distress. Appearance: She is well-developed. She is not diaphoretic. HENT:      Head: Normocephalic and atraumatic. Right Ear: External ear normal.      Left Ear: External ear normal.      Nose: Nose normal. No congestion or rhinorrhea. Mouth/Throat:      Mouth: Mucous membranes are moist.      Dentition: Dental tenderness and dental caries present. No gingival swelling or dental abscesses. Palate: No mass. Pharynx: No oropharyngeal exudate, posterior oropharyngeal erythema or uvula swelling. Comments: Multiple dental caries noted. Severe decay with erosion to the gumline of the anterior portion of tooth #29. Moderate tenderness to percussion. Eyes:      General: No scleral icterus. Right eye: No discharge. Left eye: No discharge. Conjunctiva/sclera: Conjunctivae normal.      Pupils: Pupils are equal, round, and reactive to light. Neck:      Thyroid: No thyromegaly. Vascular: No JVD. Trachea: No tracheal deviation. Cardiovascular:      Rate and Rhythm: Normal rate and regular rhythm. Heart sounds: Normal heart sounds. No murmur. No friction rub. No gallop. Pulmonary:      Effort: Pulmonary effort is normal. No respiratory distress. Breath sounds: Normal breath sounds. No stridor. No wheezing or rales. Chest:      Chest wall: No tenderness. Abdominal:      General: Bowel sounds are normal. There is no distension. Palpations: Abdomen is soft. There is no mass. Tenderness: There is no abdominal tenderness. There is no guarding or rebound. Genitourinary:     Vagina: Normal.   Musculoskeletal: Normal range of motion. General: No tenderness. Lymphadenopathy:      Cervical: No cervical adenopathy. Skin:     General: Skin is warm and dry. Coloration: Skin is not pale.       Findings: No erythema or rash.   Neurological:      Mental Status: She is alert and oriented to person, place, and time. Cranial Nerves: No cranial nerve deficit. Deep Tendon Reflexes: Reflexes are normal and symmetric. Psychiatric:         Behavior: Behavior normal.         Judgment: Judgment normal.          MDM  Number of Diagnoses or Management Options  Dental caries into pulp:   Pain due to dental caries:   Diagnosis management comments: Differential diagnosis includes: Dental caries, dental abscess, gingivitis       Amount and/or Complexity of Data Reviewed  Review and summarize past medical records: yes           Procedures    Orders Placed This Encounter    amoxicillin (AMOXIL) capsule 1,000 mg     Order Specific Question:   Antibiotic Indications     Answer: Other     Order Specific Question:   Other Abx Indication     Answer:   Dental infection    ibuprofen (MOTRIN) tablet 800 mg    amoxicillin (AMOXIL) 875 mg tablet     Sig: Take 1 Tab by mouth two (2) times a day for 10 days. Dispense:  20 Tab     Refill:  0    ibuprofen (MOTRIN) 800 mg tablet     Sig: Take 1 Tab by mouth every eight (8) hours as needed for Pain for up to 10 days. Dispense:  30 Tab     Refill:  0     9:45 AM Upon re-evaluation the patient's symptoms have improved. Pt has non-toxic appearance and condition is stable for discharge. She was informed of her diagnosis results, instructed to f/u with Tate Butt and return to the ED upon worsening of symptoms. All questions and concerns were addressed. Diagnosis:   1. Pain due to dental caries    2.  Dental caries into pulp          Disposition: Discharge home    Follow-up Information     Follow up With Specialties Details Why Contact Info    Lashell Costa MD Family Medicine, Internal Medicine Schedule an appointment as soon as possible for a visit in 3 days  [address] [address] DEPT Emergency Medicine  As needed, If symptoms worsen [address] [place] [address]    Cori Zamzamjeannie  Schedule an appointment as soon as possible for a visit today  [phone]  [phone]          Patient's Medications   Start Taking    AMOXICILLIN (AMOXIL) 875 MG TABLET    Take 1 Tab by mouth two (2) times a day for 10 days. IBUPROFEN (MOTRIN) 800 MG TABLET    Take 1 Tab by mouth every eight (8) hours as needed for Pain for up to 10 days. Continue Taking    No medications on file   These Medications have changed    No medications on file   Stop Taking    CYCLOBENZAPRINE (FLEXERIL) 10 MG TABLET    Take 1 Tab by mouth three (3) times daily as needed for Muscle Spasm(s). IBUPROFEN (MOTRIN) 800 MG TABLET    Take 800 mg by mouth every eight (8) hours as needed for Pain.

## 2020-11-27 NOTE — LETTER
[address] [facility] EMERGENCY DEPT 
[address] 
[address] [facility] [id number] [phone] Work/School Note Date: 11/27/2020 To Whom It May concern: 
 
Kyler Elizabeth was seen and treated today in the emergency room by the following provider(s): 
Attending Provider: DO. Autumn Brewer Glenn may return to work on 12/1/2020. Sincerely, 
 
 
 
 
Tonya Ge

## 2020-11-27 NOTE — DISCHARGE INSTRUCTIONS
Patient Education        Tooth Decay: Care Instructions  Your Care Instructions    Tooth decay is damage to a tooth caused by plaque. Plaque is a thin film of bacteria that sticks to the teeth above and below the gum line. If plaque isn't removed from the teeth, it can build up and harden into tartar. The bacteria in plaque and tartar use sugars in food to make acids. These acids can cause tooth decay and gum disease. Any part of your tooth can decay, from the roots below the gum line to the chewing surface. Decay can affect the outer layer (enamel) or inner layer (dentin) of your teeth. The deeper the decay, the worse the damage. Untreated tooth decay will get worse and may lead to tooth loss. If you have a small hole (cavity) in your tooth, your dentist can repair it by removing the decay and filling the hole. If you have deeper decay, you may need more treatment. A very badly damaged tooth may have to be removed. Follow-up care is a key part of your treatment and safety. Be sure to make and go to all appointments, and call your dentist if you are having problems. It's also a good idea to know your test results and keep a list of the medicines you take. How can you care for yourself at home? If you have pain:  · Take an over-the-counter pain medicine, such as acetaminophen (Tylenol), ibuprofen (Advil, Motrin), or naproxen (Aleve). Be safe with medicines. Read and follow all instructions on the label. ? Do not take two or more pain medicines at the same time unless the doctor told you to. Many pain medicines have acetaminophen, which is Tylenol. Too much acetaminophen (Tylenol) can be harmful. · Put ice or a cold pack on your cheek over the tooth for 10 to 15 minutes at a time. Put a thin cloth between the ice and your skin. To prevent tooth decay  · Brush teeth twice a day, and floss once a day. Brushing with fluoride toothpaste and flossing may be enough to reverse early decay.   · Use a toothbrush with soft, rounded-end bristles and a head that is small enough to reach all parts of your teeth and mouth. Replace your toothbrush every 3 or 4 months. You may also use an electric toothbrush that has rotating and oscillating (back-and-forth) action. · Ask your dentist about having fluoride treatments at the dental office. · Brush your tongue to help get rid of bacteria. · Eat healthy foods that include whole grains, vegetables, and fruits. · Have your teeth cleaned by a professional at least two times a year. · Do not smoke or use smokeless tobacco. Tobacco can make tooth decay worse. When should you call for help? Call 911 anytime you think you may need emergency care. For example, call if:    · You have trouble breathing. Call your dentist now or seek immediate medical care if:    · You have new or worse symptoms of infection, such as:  ? Increased pain, swelling, warmth, or redness. ? Red streaks leading from the area. ? Pus draining from the area. ? A fever. Watch closely for changes in your health, and be sure to contact your doctor if:    · You do not get better as expected. Where can you learn more? Go to http://www.aguilar.com/  Enter F057 in the search box to learn more about \"Tooth Decay: Care Instructions. \"  Current as of: March 25, 2020               Content Version: 12.6  © 4166-4465 [employer]. Care instructions adapted under license by [employer] (which disclaims liability or warranty for this information). If you have questions about a medical condition or this instruction, always ask your healthcare professional. [address] [address] any warranty or liability for your use of this information. Patient Education        Tooth Extraction: Care Instructions  Your Care Instructions  Tooth extraction is the complete removal of a tooth, from the part of the tooth that you can see to the roots that are in the jawbone. Damage caused by tooth decay is the most common reason for a tooth's extraction. Other reasons for removing a tooth include infection or injury. Removing the tooth can help keep an infection from spreading to other parts of the mouth. And some teeth may be removed to prevent or correct crowding in the mouth. Your dentist or an oral surgeon, who specializes in surgeries of the mouth, can remove a tooth. It can be done in the dentist's or oral surgeon's office. The dentist first numbs the area around the tooth. You may also get medicine to help you relax. The dentist uses a special tool to grasp the tooth and lift it out of the tooth socket. You may feel a tug on the tooth as it is being removed. If the tooth breaks while being pulled, or if it doesn't come out in one piece, the dentist uses other tools to remove the rest of the tooth. After the tooth comes out, you will be given a piece of gauze to bite down on. This will help stop bleeding. You may need stitches. You will be told if and when you should come back to have the stitches removed. You may have some pain, bleeding, or swelling afterward. The dentist may give you medicine for pain. The pain should steadily decrease in the days after the extraction. A blood clot will form in the tooth socket after the extraction. The clot protects the bone during healing. If that blood clot gets loose or comes out of the socket, you may have a dry socket, which exposes the bone. A dry socket may last for several days and can cause severe pain. If you get a dry socket, your dentist can treat it with medicine. You and your dentist may want to discuss options to replace the removed tooth. Options include an implant, a denture, or a bridge. Follow-up care is a key part of your treatment and safety. Be sure to make and go to all appointments, and call your dentist if you are having problems.  It's also a good idea to know your test results and keep a list of the medicines you take. How can you care for yourself at home? · While your mouth is numb, be careful not to bite your tongue or the inside of your cheek or lip. · Be safe with medicines. Read and follow all instructions on the label. ? If the dentist gave you a prescription medicine for pain, take it as prescribed. ? If you are not taking a prescription pain medicine, ask your dentist if you can take an over-the-counter medicine. · If your dentist prescribed antibiotics, take them as directed. Do not stop taking them just because you feel better. You need to take the full course of antibiotics. · If you have bleeding, bite gently on a gauze pad. Change the pad as it becomes soaked with blood. · After 24 hours, rinse your mouth gently with warm salt water several times a day. Your dentist may recommend other mouth rinses if needed. Do not rinse hard. This can loosen the blood clot and delay healing. · Avoid rubbing the area with your tongue. And don't use a straw for the first few days. Both of these actions can loosen the blood clot and delay healing. · Avoid chewing in the area where the tooth was removed until your mouth heals. Soft foods like gelatin or soup might be easier to eat and may help you heal.  · If needed, put ice or a cold pack on your cheek for 10 to 20 minutes at a time. Try to do this every 1 to 2 hours for the next 3 days (when you are awake) or until the swelling goes down. Put a thin cloth between the ice and your skin. · Do not smoke or use spit tobacco for at least 24 hours after the extraction. Tobacco use can delay healing. When should you call for help? Call 911 anytime you think you may need emergency care. For example, call if:    · You passed out (lost consciousness).     · You have trouble breathing.    Call your dentist now or seek immediate medical care if:    · You have pain that does not get better after you take pain medicine.     · You have loose stitches, or your incision comes open.     · You have new or more bleeding from the site.     · You have signs of infection, such as:  ? Increased pain, swelling, warmth, or redness. ? Red streaks leading from the area. ? Pus draining from the area. ? A fever. Watch closely for changes in your health, and be sure to contact your dentist if you have any problems. Where can you learn more? Go to http://www.gray.com/  Enter J505 in the search box to learn more about \"Tooth Extraction: Care Instructions. \"  Current as of: March 25, 2020               Content Version: 12.6  © 7259-8244 [employer]. Care instructions adapted under license by [employer] (which disclaims liability or warranty for this information). If you have questions about a medical condition or this instruction, always ask your healthcare professional. [address] any warranty or liability for your use of this information.

## 2020-11-27 NOTE — ED NOTES
I have reviewed prescription, discharge and follow up instructions with the patient. The patient verbalized understanding.  Patient left unit ambulatory in no apparent distress for discharge to home

## 2021-05-07 ENCOUNTER — OFFICE VISIT (OUTPATIENT)
Dept: FAMILY MEDICINE CLINIC | Age: 66
End: 2021-05-07
Payer: MEDICAID

## 2021-05-07 ENCOUNTER — HOSPITAL ENCOUNTER (OUTPATIENT)
Dept: MAMMOGRAPHY | Age: 66
Discharge: HOME | End: 2021-05-07
Payer: MEDICAID

## 2021-05-07 ENCOUNTER — HOSPITAL ENCOUNTER (OUTPATIENT)
Dept: WOMENS IMAGING | Age: 66
Discharge: HOME | End: 2021-05-07
Payer: MEDICAID

## 2021-05-07 VITALS
HEIGHT: 61 IN | RESPIRATION RATE: 16 BRPM | BODY MASS INDEX: 31.53 KG/M2 | TEMPERATURE: 98 F | WEIGHT: 167 LBS | DIASTOLIC BLOOD PRESSURE: 86 MMHG | SYSTOLIC BLOOD PRESSURE: 129 MMHG | HEART RATE: 79 BPM | OXYGEN SATURATION: 98 %

## 2021-05-07 DIAGNOSIS — Z12.31 BREAST CANCER SCREENING BY MAMMOGRAM: ICD-10-CM

## 2021-05-07 DIAGNOSIS — M65.312 TRIGGER THUMB OF LEFT HAND: ICD-10-CM

## 2021-05-07 DIAGNOSIS — Z00.00 ANNUAL PHYSICAL EXAM: Primary | ICD-10-CM

## 2021-05-07 PROCEDURE — 20550 NJX 1 TENDON SHEATH/LIGAMENT: CPT | Performed by: FAMILY MEDICINE

## 2021-05-07 PROCEDURE — 77063 BREAST TOMOSYNTHESIS BI: CPT

## 2021-05-07 PROCEDURE — 99396 PREV VISIT EST AGE 40-64: CPT | Performed by: FAMILY MEDICINE

## 2021-05-07 RX ORDER — ACETAMINOPHEN AND CODEINE PHOSPHATE 300; 30 MG/1; MG/1
1 TABLET ORAL
Qty: 12 TAB | Refills: 0 | Status: SHIPPED | OUTPATIENT
Start: 2021-05-07 | End: 2021-05-10

## 2021-05-07 RX ORDER — ACETAMINOPHEN AND CODEINE PHOSPHATE 300; 30 MG/1; MG/1
1 TABLET ORAL
Qty: 12 TAB | Refills: 0 | Status: SHIPPED | OUTPATIENT
Start: 2021-05-07 | End: 2021-05-07 | Stop reason: SDUPTHER

## 2021-05-07 RX ORDER — TRIAMCINOLONE ACETONIDE 40 MG/ML
INJECTION, SUSPENSION INTRA-ARTICULAR; INTRAMUSCULAR
Qty: 1 ML | Refills: 0
Start: 2021-05-07 | End: 2022-08-26 | Stop reason: ALTCHOICE

## 2021-05-07 NOTE — PROGRESS NOTES
Isabella German presents today for   Chief Complaint   Patient presents with    Thumb Pain     left hand       Is someone accompanying this pt? no    Is the patient using any DME equipment during OV? no    Depression Screening:  3 most recent PHQ Screens 5/7/2021   Little interest or pleasure in doing things Not at all   Feeling down, depressed, irritable, or hopeless Not at all   Total Score PHQ 2 0       Learning Assessment:  Learning Assessment 9/24/2014   PRIMARY LEARNER Patient   PRIMARY LANGUAGE ENGLISH   LEARNER PREFERENCE PRIMARY DEMONSTRATION   ANSWERED BY patient   RELATIONSHIP SELF       Abuse Screening:  Abuse Screening Questionnaire 9/24/2014   Do you ever feel afraid of your partner? N   Are you in a relationship with someone who physically or mentally threatens you? N   Is it safe for you to go home? Y       Fall Risk  No flowsheet data found. Health Maintenance reviewed and discussed and ordered per Provider. Health Maintenance Due   Topic Date Due    COVID-19 Vaccine (1) Never done    DTaP/Tdap/Td series (1 - Tdap) Never done    Shingrix Vaccine Age 50> (1 of 2) Never done    Colorectal Cancer Screening Combo  Never done    PAP AKA CERVICAL CYTOLOGY  09/04/2017    Breast Cancer Screen Mammogram  01/25/2020   . Coordination of Care:  1. Have you been to the ER, urgent care clinic since your last visit? Hospitalized since your last visit? no    2. Have you seen or consulted any other health care providers outside of the [address] [place] [address] since your last visit? Include any pap smears or colon screening. no      Last  Checked na  Last UDS Checked na  Last Pain contract signed: na    Patient presents in office today for routine care.   Patient concerns: pain left thumb

## 2021-05-07 NOTE — LETTER
NOTIFICATION RETURN TO WORK  
 
5/7/2021 11:39 AM 
 
Ms. Autumn ERIC Coaxum [address] [place] [phone] To Whom It May Concern: 
 
Isabella German is currently under the care of [address] [place] [address]. She will return to work on: 5/10/2021 If there are questions or concerns please have the patient contact our office. Sincerely, Shweta Vidales MD

## 2021-05-07 NOTE — PROGRESS NOTES
Fern Mehta is a 60 y.o.  female and presents with    Chief Complaint   Patient presents with    Thumb Pain     left hand    Complete Physical       Subjective: Well Adult Physical   Patient here for a comprehensive physical exam.The patient reports problems - left thumb pain with burning with movement; she has used icy hot . She had similar symptoms in her right hand a couple of years ago. She has not had any trauma to her thumb. She is working in housekeeping; she has burning. The pain has been present for 2 weeks. 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 Do you take any herbs or supplements that were not prescribed by a doctor? no Are you taking calcium supplements? no Are you taking aspirin daily? not applicable    ROS   General ROS: negative for - chills, fatigue or fever  Psychological ROS: negative for - anxiety or depression  Ophthalmic ROS: negative for - blurry vision or itchy eyes  ENT ROS: negative for - headaches, nasal congestion or sore throat  Endocrine ROS: negative for - polydipsia/polyuria or temperature intolerance  Respiratory ROS: no cough, shortness of breath, or wheezing  Cardiovascular ROS: no chest pain or dyspnea on exertion  Gastrointestinal ROS: no abdominal pain, change in bowel habits, or black or bloody stools  Genito-Urinary ROS: no dysuria, trouble voiding, or hematuria  Neurological ROS: negative for - numbness/tingling or weakness  Dermatological ROS: negative for - rash or skin lesion changes    All other systems reviewed and are negative.       Objective:  Vitals:    05/07/21 1059   BP: 129/86   Pulse: 79   Resp: 16   Temp: 98 °F (36.7 °C)   TempSrc: Temporal   SpO2: 98%   Weight: 167 lb (75.8 kg)   Height: 5' 1\" (1.549 m)   PainSc:  10 - Worst pain ever   PainLoc: Generalized     BMI 31.55 kg/m²       General appearance  alert, cooperative, no distress, appears stated age   Head  Normocephalic, without obvious abnormality, atraumatic Eyes  conjunctivae/corneas clear. PERRL, EOM's intact. Ears  normal TM's and external ear canals AU   Nose Nares normal. Septum midline. Mucosa normal. No drainage or sinus tenderness. Throat Lips, mucosa, and tongue normal. Teeth and gums normal   Neck supple, symmetrical, trachea midline, no adenopathy, thyroid: not enlarged, symmetric, no tenderness/mass/nodules, and no JVD   Back   symmetric, no curvature. ROM normal. No CVA tenderness   Lungs   clear to auscultation bilaterally   Breasts  Not examined   Heart  regular rate and rhythm, S1, S2 normal, no murmur, click, rub or gallop   Abdomen   soft, non-tender.  Bowel sounds normal. No masses,  No organomegaly   Pelvic Deferred   Extremities extremities normal, atraumatic, no cyanosis or edema   Pulses 2+ and symmetric   Skin Skin color, texture, turgor normal. No rashes or lesions   Lymph nodes Cervical, supraclavicular, and axillary nodes normal.   Neurologic Normal     msk left thumb clicking with pain with motion    LABS     TESTS  [facility]  OFFICE PROCEDURE PROGRESS NOTE        Chart reviewed for the following:   Ryan Paniagua MD, have reviewed the History, Physical and updated the Allergic reactions for Autumn L Coaxum     TIME OUT performed immediately prior to start of procedure:   I, Shamir Russell MD, have performed the following reviews on Autumn L Coaxum prior to the start of the procedure:            * Patient was identified by name and date of birth   * Agreement on procedure being performed was verified  * Risks and Benefits explained to the patient  * Procedure site verified and marked as necessary  * Patient was positioned for comfort  * Understanding confirmed and consent was signed and verified     Time: .11:36 AM       Date of procedure: 5/7/2021    Procedure performed by:  Shamir Russell MD    Provider assisted by: Brinda Grimaldo LPN    Patient assisted by: self    How tolerated by patient: tolerated the procedure well with no complications    Comments: none    after obtaining informed consent the left thumb was prepped in sterile fashion; ethyl chloride used for topical anesthesia; 1 cc lidocaine 1% without epi used for subcutaneous local anesthesia; 2 cc 1:1 marcaine 0.5%,  and kenalog 40 mg/cc injected into thumb tendon sheath; EBL < 1 cc; post procedure pain 0/10    Assessment/Plan:    1. Annual physical exam  Reviewed preventive recommendations    2. Trigger thumb of left hand    - INJECT TENDON SHEATH/LIGAMENT  - TRIAMCINOLONE ACETONIDE INJ  - triamcinolone acetonide (Kenalog) 40 mg/mL injection; Inject into tendon sheath  Dispense: 1 mL; Refill: 0  - acetaminophen-codeine (Tylenol-Codeine #3) 300-30 mg per tablet; Take 1 Tab by mouth every six (6) hours as needed for Pain for up to 3 days. Max Daily Amount: 4 Tabs. Dispense: 12 Tab; Refill: 0    3. Breast cancer screening by mammogram  Mammogram ordered      Lab review: no lab studies available for review at time of visit      I have discussed the diagnosis with the patient and the intended plan as seen in the above orders. The patient has received an after-visit summary and questions were answered concerning future plans. I have discussed medication side effects and warnings with the patient as well. I have reviewed the plan of care with the patient, accepted their input and they are in agreement with the treatment goals.

## 2021-09-23 ENCOUNTER — OFFICE VISIT (OUTPATIENT)
Dept: FAMILY MEDICINE CLINIC | Age: 66
End: 2021-09-23
Payer: MEDICAID

## 2021-09-23 VITALS
DIASTOLIC BLOOD PRESSURE: 80 MMHG | OXYGEN SATURATION: 100 % | RESPIRATION RATE: 16 BRPM | TEMPERATURE: 97.9 F | SYSTOLIC BLOOD PRESSURE: 138 MMHG | HEART RATE: 80 BPM | BODY MASS INDEX: 31.72 KG/M2 | WEIGHT: 168 LBS | HEIGHT: 61 IN

## 2021-09-23 DIAGNOSIS — M65.312 TRIGGER THUMB OF LEFT HAND: ICD-10-CM

## 2021-09-23 DIAGNOSIS — H11.32 SUBCONJUNCTIVAL HEMORRHAGE OF LEFT EYE: Primary | ICD-10-CM

## 2021-09-23 DIAGNOSIS — Z12.11 COLON CANCER SCREENING: ICD-10-CM

## 2021-09-23 DIAGNOSIS — Z23 NEEDS FLU SHOT: ICD-10-CM

## 2021-09-23 PROCEDURE — 99214 OFFICE O/P EST MOD 30 MIN: CPT | Performed by: FAMILY MEDICINE

## 2021-09-23 PROCEDURE — 90694 VACC AIIV4 NO PRSRV 0.5ML IM: CPT | Performed by: FAMILY MEDICINE

## 2021-09-23 RX ORDER — DICLOFENAC SODIUM 10 MG/G
GEL TOPICAL 4 TIMES DAILY
Qty: 100 G | Refills: 1 | Status: SHIPPED | OUTPATIENT
Start: 2021-09-23 | End: 2022-08-26 | Stop reason: SDUPTHER

## 2021-09-23 NOTE — PROGRESS NOTES
Maurisio Roldan presents today for   Chief Complaint   Patient presents with   Rebecca Moralez       Is someone accompanying this pt? no    Is the patient using any DME equipment during OV? no    Depression Screening:  3 most recent PHQ Screens 5/7/2021   Little interest or pleasure in doing things Not at all   Feeling down, depressed, irritable, or hopeless Not at all   Total Score PHQ 2 0       Learning Assessment:  Learning Assessment 9/24/2014   PRIMARY LEARNER Patient   PRIMARY LANGUAGE ENGLISH   LEARNER PREFERENCE PRIMARY DEMONSTRATION   ANSWERED BY patient   RELATIONSHIP SELF       Abuse Screening:  Abuse Screening Questionnaire 9/24/2014   Do you ever feel afraid of your partner? N   Are you in a relationship with someone who physically or mentally threatens you? N   Is it safe for you to go home? Y       Fall Risk  No flowsheet data found. Health Maintenance reviewed and discussed and ordered per Provider. Health Maintenance Due   Topic Date Due    COVID-19 Vaccine (1) Never done    DTaP/Tdap/Td series (1 - Tdap) Never done    Colorectal Cancer Screening Combo  Never done    Shingrix Vaccine Age 50> (1 of 2) Never done    Cervical cancer screen  09/04/2019    Flu Vaccine (1) Never done    Bone Densitometry (Dexa) Screening  12/17/2021   . Coordination of Care:  1. Have you been to the ER, urgent care clinic since your last visit? Hospitalized since your last visit? no    2. Have you seen or consulted any other health care providers outside of the [address] [place] [address] since your last visit? Include any pap smears or colon screening. no      Last  Checked na  Last UDS Checked na  Last Pain contract signed: na    Patient presents in office today for routine care.   Patient concerns: left eye is red

## 2021-09-23 NOTE — PROGRESS NOTES
Ester Velasquez is a 60 y.o.  female and presents with    Chief Complaint   Patient presents with    Red Eye     Subjective:  Red Eye  Patient presents for evaluation of redness. She has noticed the above symptoms in the left eye for 1 day. Onset was sudden. Symptoms have included change in appearance. Patient denies tearing, pain, blurred vision, foreign body sensation, visual field deficit, photophobia, itching. There is a history of glasses; no trauma. The patient reports problems - left thumb pain with burning with movement; she has used icy hot . She had similar symptoms in her right hand a couple of years ago. She has not had any trauma to her thumb. She is working in housekeeping; she has burning. The pain has been present for 2 weeks. 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 Do you take any herbs or supplements that were not prescribed by a doctor? no Are you taking calcium supplements?  no Are you taking aspirin daily? not applicable    ROS   General ROS: negative for - chills, fatigue or fever  Psychological ROS: negative for - anxiety or depression  Ophthalmic ROS: negative for - blurry vision or itchy eyes  ENT ROS: negative for - headaches, nasal congestion or sore throat  Endocrine ROS: negative for - polydipsia/polyuria or temperature intolerance  Respiratory ROS: no cough, shortness of breath, or wheezing  Cardiovascular ROS: no chest pain or dyspnea on exertion  Gastrointestinal ROS: no abdominal pain, change in bowel habits, or black or bloody stools  Genito-Urinary ROS: no dysuria, trouble voiding, or hematuria  Neurological ROS: negative for - numbness/tingling or weakness  Dermatological ROS: negative for - rash or skin lesion changes     All other systems reviewed and are negative      Objective:  Vitals:    09/23/21 1359   BP: (!) 145/86   Pulse: 80   Resp: 16   Temp: 97.9 °F (36.6 °C)   TempSrc: Temporal   SpO2: 100%   Weight: 168 lb (76.2 kg) Height: 5' 1\" (1.549 m)   PainSc:   0 - No pain       alert, well appearing, and in no distress, oriented to person, place, and time and obese  Mental status - normal mood, behavior, speech, dress, motor activity, and thought processes  Eyes - PERRLA; left eye subconjunctival hemorrhage  Chest - clear to auscultation, no wheezes, rales or rhonchi, symmetric air entry  Heart - normal rate, regular rhythm, normal S1, S2, no murmurs, rubs, clicks or gallops  Neurological - cranial nerves II through XII intact    LABS     TESTS      Assessment/Plan:    1. Subconjunctival hemorrhage of left eye  Discussed diagnosis with patient    2. Trigger thumb of left hand  nsaid ordered  - diclofenac (VOLTAREN) 1 % gel; Apply  to affected area four (4) times daily. Dispense: 100 g; Refill: 1    3. Needs flu shot    - INFLUENZA VIRUS VACCINE, HIGH DOSE SEASONAL, PRESERVATIVE FREE  - SD IMMUNIZ ADMIN,1 SINGLE/COMB VAC/TOXOID      Lab review: no lab studies available for review at time of visit      I have discussed the diagnosis with the patient and the intended plan as seen in the above orders. The patient has received an after-visit summary and questions were answered concerning future plans. I have discussed medication side effects and warnings with the patient as well. I have reviewed the plan of care with the patient, accepted their input and they are in agreement with the treatment goals.

## 2021-09-23 NOTE — LETTER
NOTIFICATION RETURN TO WORK     9/23/2021 2:32 PM    Ms. Madelyn Conte Coaxum  [address] [id number]      To Whom It May Concern:    Jared Brown is currently under the care of [address] [place] [address]. She will return to work on: 9/27/2021    If there are questions or concerns please have the patient contact our office.         Sincerely,      Monica Amado MD

## 2022-01-15 ENCOUNTER — NURSE TRIAGE (OUTPATIENT)
Dept: OTHER | Facility: CLINIC | Age: 67
End: 2022-01-15

## 2022-01-15 NOTE — TELEPHONE ENCOUNTER
Received call from Jen at [facility] with Red Flag Complaint. Subjective: Caller states \"I took off work, and they will need to see paperwork. \"     Current Symptoms: \"real bad cough\" is only symptom, suspects it's due to bus stop waiting in the morning. Able to speak in full sentences during call. Denies shortness of breath, just congested. Onset: 1 week ago; waxing and waning when ran out of cough medicine    Associated Symptoms: NA    Pain Severity: no pain; NA; NA    Temperature: no fever    What has been tried: cough medicine but ran out    LMP: NA Pregnant: NA    Recommended disposition: telemedicine or urgent care. Care advice provided, patient verbalizes understanding; denies any other questions or concerns; instructed to call back for any new or worsening symptoms. Patient proceeding to nearest [facility]     Attention Provider: Thank you for allowing me to participate in the care of your patient. The patient was connected to triage in response to information provided to the [facility]. Please do not respond through this encounter as the response is not directed to a shared pool.         Reason for Disposition   HIGH RISK for severe COVID complications (e.g., age > 59 years, obesity with BMI > 25, pregnant, chronic lung disease or other chronic medical condition)  (Exception: Already seen by PCP and no new or worsening symptoms.)    Protocols used: CORONAVIRUS (COVID-19) DIAGNOSED OR SUSPECTED-ADULT-

## 2022-05-04 NOTE — PROGRESS NOTES
\"This addendum has been created to correct a medical record clerical error,  Wherein an erroneous  was selected for your administered flu vaccine. \"

## 2022-08-26 ENCOUNTER — OFFICE VISIT (OUTPATIENT)
Dept: FAMILY MEDICINE CLINIC | Age: 67
End: 2022-08-26
Payer: MEDICARE

## 2022-08-26 ENCOUNTER — HOSPITAL ENCOUNTER (OUTPATIENT)
Dept: WOMENS IMAGING | Age: 67
Discharge: HOME | End: 2022-08-26
Attending: FAMILY MEDICINE
Payer: MEDICARE

## 2022-08-26 ENCOUNTER — HOSPITAL ENCOUNTER (OUTPATIENT)
Dept: LAB | Age: 67
Discharge: HOME | End: 2022-08-26
Payer: MEDICARE

## 2022-08-26 ENCOUNTER — HOSPITAL ENCOUNTER (OUTPATIENT)
Dept: BONE DENSITY | Age: 67
Discharge: HOME | End: 2022-08-26
Attending: FAMILY MEDICINE
Payer: MEDICARE

## 2022-08-26 VITALS
DIASTOLIC BLOOD PRESSURE: 90 MMHG | TEMPERATURE: 97.5 F | BODY MASS INDEX: 32.74 KG/M2 | RESPIRATION RATE: 17 BRPM | SYSTOLIC BLOOD PRESSURE: 138 MMHG | HEIGHT: 61 IN | OXYGEN SATURATION: 97 % | WEIGHT: 173.4 LBS | HEART RATE: 75 BPM

## 2022-08-26 DIAGNOSIS — M77.11 RIGHT LATERAL EPICONDYLITIS: ICD-10-CM

## 2022-08-26 DIAGNOSIS — Z13.220 ENCOUNTER FOR LIPID SCREENING FOR CARDIOVASCULAR DISEASE: ICD-10-CM

## 2022-08-26 DIAGNOSIS — Z13.6 ENCOUNTER FOR LIPID SCREENING FOR CARDIOVASCULAR DISEASE: ICD-10-CM

## 2022-08-26 DIAGNOSIS — E66.09 CLASS 1 OBESITY DUE TO EXCESS CALORIES WITHOUT SERIOUS COMORBIDITY WITH BODY MASS INDEX (BMI) OF 32.0 TO 32.9 IN ADULT: ICD-10-CM

## 2022-08-26 DIAGNOSIS — M54.50 ACUTE MIDLINE LOW BACK PAIN WITHOUT SCIATICA: ICD-10-CM

## 2022-08-26 DIAGNOSIS — Z12.31 VISIT FOR SCREENING MAMMOGRAM: ICD-10-CM

## 2022-08-26 DIAGNOSIS — I10 ESSENTIAL HYPERTENSION: ICD-10-CM

## 2022-08-26 DIAGNOSIS — R93.7 ABNORMAL BONE DENSITY SCREENING: ICD-10-CM

## 2022-08-26 DIAGNOSIS — Z00.00 WELCOME TO MEDICARE PREVENTIVE VISIT: Primary | ICD-10-CM

## 2022-08-26 LAB
ALBUMIN SERPL-MCNC: 3.7 G/DL (ref 3.4–5)
ALBUMIN/GLOB SERPL: 0.9 {RATIO} (ref 0.8–1.7)
ALP SERPL-CCNC: 53 U/L (ref 45–117)
ALT SERPL-CCNC: 17 U/L (ref 13–56)
ANION GAP SERPL CALC-SCNC: 4 MMOL/L (ref 3–18)
AST SERPL-CCNC: 10 U/L (ref 10–38)
BASOPHILS # BLD: 0 K/UL (ref 0–0.1)
BASOPHILS NFR BLD: 0 % (ref 0–2)
BILIRUB SERPL-MCNC: 0.2 MG/DL (ref 0.2–1)
BUN SERPL-MCNC: 13 MG/DL (ref 7–18)
BUN/CREAT SERPL: 17 (ref 12–20)
CALCIUM SERPL-MCNC: 9.7 MG/DL (ref 8.5–10.1)
CHLORIDE SERPL-SCNC: 111 MMOL/L (ref 100–111)
CHOLEST SERPL-MCNC: 184 MG/DL
CO2 SERPL-SCNC: 29 MMOL/L (ref 21–32)
CREAT SERPL-MCNC: 0.76 MG/DL (ref 0.6–1.3)
DIFFERENTIAL METHOD BLD: ABNORMAL
EOSINOPHIL # BLD: 0.2 K/UL (ref 0–0.4)
EOSINOPHIL NFR BLD: 2 % (ref 0–5)
ERYTHROCYTE [DISTWIDTH] IN BLOOD BY AUTOMATED COUNT: 14.5 % (ref 11.6–14.5)
GLOBULIN SER CALC-MCNC: 4 G/DL (ref 2–4)
GLUCOSE SERPL-MCNC: 84 MG/DL (ref 74–99)
HCT VFR BLD AUTO: 42.4 % (ref 35–45)
HDLC SERPL-MCNC: 52 MG/DL (ref 40–60)
HDLC SERPL: 3.5 {RATIO} (ref 0–5)
HGB BLD-MCNC: 13.3 G/DL (ref 12–16)
IMM GRANULOCYTES # BLD AUTO: 0 K/UL (ref 0–0.04)
IMM GRANULOCYTES NFR BLD AUTO: 0 % (ref 0–0.5)
LDLC SERPL CALC-MCNC: 103 MG/DL (ref 0–100)
LIPID PROFILE,FLP: ABNORMAL
LYMPHOCYTES # BLD: 2.8 K/UL (ref 0.9–3.6)
LYMPHOCYTES NFR BLD: 40 % (ref 21–52)
MCH RBC QN AUTO: 29.1 PG (ref 24–34)
MCHC RBC AUTO-ENTMCNC: 31.4 G/DL (ref 31–37)
MCV RBC AUTO: 92.8 FL (ref 78–100)
MONOCYTES # BLD: 0.7 K/UL (ref 0.05–1.2)
MONOCYTES NFR BLD: 11 % (ref 3–10)
NEUTS SEG # BLD: 3.2 K/UL (ref 1.8–8)
NEUTS SEG NFR BLD: 46 % (ref 40–73)
NRBC # BLD: 0 K/UL (ref 0–0.01)
NRBC BLD-RTO: 0 PER 100 WBC
PLATELET # BLD AUTO: 252 K/UL (ref 135–420)
PMV BLD AUTO: 10.7 FL (ref 9.2–11.8)
POTASSIUM SERPL-SCNC: 3.8 MMOL/L (ref 3.5–5.5)
PROT SERPL-MCNC: 7.7 G/DL (ref 6.4–8.2)
RBC # BLD AUTO: 4.57 M/UL (ref 4.2–5.3)
SODIUM SERPL-SCNC: 144 MMOL/L (ref 136–145)
TRIGL SERPL-MCNC: 145 MG/DL (ref ?–150)
VLDLC SERPL CALC-MCNC: 29 MG/DL
WBC # BLD AUTO: 6.9 K/UL (ref 4.6–13.2)

## 2022-08-26 PROCEDURE — 77063 BREAST TOMOSYNTHESIS BI: CPT

## 2022-08-26 PROCEDURE — G8536 NO DOC ELDER MAL SCRN: HCPCS | Performed by: FAMILY MEDICINE

## 2022-08-26 PROCEDURE — G0402 INITIAL PREVENTIVE EXAM: HCPCS | Performed by: FAMILY MEDICINE

## 2022-08-26 PROCEDURE — 85025 COMPLETE CBC W/AUTO DIFF WBC: CPT

## 2022-08-26 PROCEDURE — G8427 DOCREV CUR MEDS BY ELIG CLIN: HCPCS | Performed by: FAMILY MEDICINE

## 2022-08-26 PROCEDURE — G9899 SCRN MAM PERF RSLTS DOC: HCPCS | Performed by: FAMILY MEDICINE

## 2022-08-26 PROCEDURE — G8432 DEP SCR NOT DOC, RNG: HCPCS | Performed by: FAMILY MEDICINE

## 2022-08-26 PROCEDURE — 99214 OFFICE O/P EST MOD 30 MIN: CPT | Performed by: FAMILY MEDICINE

## 2022-08-26 PROCEDURE — G8417 CALC BMI ABV UP PARAM F/U: HCPCS | Performed by: FAMILY MEDICINE

## 2022-08-26 PROCEDURE — 80053 COMPREHEN METABOLIC PANEL: CPT

## 2022-08-26 PROCEDURE — 36415 COLL VENOUS BLD VENIPUNCTURE: CPT

## 2022-08-26 PROCEDURE — 1123F ACP DISCUSS/DSCN MKR DOCD: CPT | Performed by: FAMILY MEDICINE

## 2022-08-26 PROCEDURE — G0403 EKG FOR INITIAL PREVENT EXAM: HCPCS | Performed by: FAMILY MEDICINE

## 2022-08-26 PROCEDURE — 1101F PT FALLS ASSESS-DOCD LE1/YR: CPT | Performed by: FAMILY MEDICINE

## 2022-08-26 PROCEDURE — 77080 DXA BONE DENSITY AXIAL: CPT

## 2022-08-26 PROCEDURE — G8399 PT W/DXA RESULTS DOCUMENT: HCPCS | Performed by: FAMILY MEDICINE

## 2022-08-26 PROCEDURE — 80061 LIPID PANEL: CPT

## 2022-08-26 PROCEDURE — 1090F PRES/ABSN URINE INCON ASSESS: CPT | Performed by: FAMILY MEDICINE

## 2022-08-26 PROCEDURE — 3017F COLORECTAL CA SCREEN DOC REV: CPT | Performed by: FAMILY MEDICINE

## 2022-08-26 RX ORDER — IBUPROFEN 800 MG/1
800 TABLET ORAL
Qty: 30 TABLET | Refills: 1 | Status: SHIPPED | OUTPATIENT
Start: 2022-08-26

## 2022-08-26 RX ORDER — LOSARTAN POTASSIUM 25 MG/1
25 TABLET ORAL DAILY
Qty: 90 TABLET | Refills: 3 | Status: SHIPPED | OUTPATIENT
Start: 2022-08-26

## 2022-08-26 RX ORDER — DICLOFENAC SODIUM 10 MG/G
GEL TOPICAL 4 TIMES DAILY
Qty: 100 G | Refills: 1 | Status: SHIPPED | OUTPATIENT
Start: 2022-08-26

## 2022-08-26 NOTE — PROGRESS NOTES
Adonis Bo is a 73 y.o. female (: 1955) presenting to address:    Chief Complaint   Patient presents with    Arm swelling    Back Pain    Physical       There were no vitals filed for this visit. Hearing/Vision:   No results found. Learning Assessment:     Learning Assessment 2014   PRIMARY LEARNER Patient   PRIMARY LANGUAGE ENGLISH   LEARNER PREFERENCE PRIMARY DEMONSTRATION   ANSWERED BY patient   RELATIONSHIP SELF     Depression Screening:     3 most recent PHQ Screens 2022   Little interest or pleasure in doing things Not at all   Feeling down, depressed, irritable, or hopeless Not at all   Total Score PHQ 2 0     Fall Risk Assessment:     Fall Risk Assessment, last 12 mths 2022   Able to walk? Yes   Fall in past 12 months? 0   Do you feel unsteady? 0   Are you worried about falling 0     Abuse Screening:     Abuse Screening Questionnaire 2014   Do you ever feel afraid of your partner? N   Are you in a relationship with someone who physically or mentally threatens you? N   Is it safe for you to go home? Y     ADL Assessment:   No flowsheet data found. Coordination of Care Questionaire:     1. \"Have you been to the ER, urgent care clinic since your last visit? Hospitalized since your last visit? \" No    2. \"Have you seen or consulted any other health care providers outside of the [address] [place] [address] since your last visit? \" No     3. For patients aged 39-70: Has the patient had a colonoscopy / FIT/ Cologuard? Yes - no Care Gap present      If the patient is female:    4. For patients aged 41-77: Has the patient had a mammogram within the past 2 years? No      5. For patients aged 21-65: Has the patient had a pap smear?  No

## 2022-08-26 NOTE — PROGRESS NOTES
This is a \"Welcome to [employer]"  Initial Preventive Physical Examination (IPPE) providing Personalized Prevention Plan Services (Performed in the first 12 months of enrollment)    Severiano Smaller is a 73 y.o.  female and presents for a welcome to Medicare physical exam       I have reviewed the patient's medical history in detail and updated the computerized patient record. History   No past medical history on file. Past Surgical History:   Procedure Laterality Date    HX CHOLECYSTECTOMY      HX TUBAL LIGATION       Current Outpatient Medications   Medication Sig Dispense Refill    diclofenac (VOLTAREN) 1 % gel Apply  to affected area four (4) times daily. (Patient not taking: Reported on 8/26/2022) 100 g 1    triamcinolone acetonide (Kenalog) 40 mg/mL injection Inject into tendon sheath (Patient not taking: Reported on 8/26/2022) 1 mL 0     No Known Allergies  Family History   Problem Relation Age of Onset    Liver Disease Mother         cirrohosis     Social History     Tobacco Use    Smoking status: Former     Types: Cigarettes    Smokeless tobacco: Never    Tobacco comments:     quit 2017   Substance Use Topics    Alcohol use: Yes     Comment: socially        generally follows a low fat low cholesterol diet, generally follows a low sodium diet, does not rigorously follow a diabetic diet, exercises regularly, nonsmoker    very active    Health Maintenance History  Immunizations reviewed, dtap due , pneumovax due, flu up to date, zoster due  colonoscopy:up to date,   Eye exam:up to date  Mammo up to date  Dexascan due    Depression Risk Factor Screening:      Patient Health Questionnaire (PHQ-2)   Over the last 2 weeks, how often have you been bothered by any of the following problems? Little interest or pleasure in doing things? Not at all. [0]  Feeling down, depressed, or hopeless? Several days.  [1]    Total Score: 1/6  PHQ-2 Assessment Scoring:   A score of 2 or more requires further screening with the PHQ-9    Alcohol Risk Factor Screening:     Women: On any occasion during the past 3 months, have you had more than 3 drinks containing alcohol? no   Do you average more than 7 drinks per week?  no    Functional Ability and Level of Safety:     Hearing Loss    Hearing is good. Activities of Daily Living   Self-care. Requires assistance with: no ADLs    Fall Risk   No fall risk factors    Abuse Screen   Pt reports that her daughter has her money and has not returned her call. Review of Systems   ROS   General ROS: negative for - chills, fatigue or fever  Psychological ROS: negative for - anxiety or depression  Ophthalmic ROS: negative for - blurry vision or itchy eyes  ENT ROS: negative for - headaches, nasal congestion or sore throat  Endocrine ROS: negative for - polydipsia/polyuria or temperature intolerance  Respiratory ROS: no cough, shortness of breath, or wheezing  Cardiovascular ROS: no chest pain or dyspnea on exertion  Gastrointestinal ROS: no abdominal pain, change in bowel habits, or black or bloody stools  Genito-Urinary ROS: no dysuria, trouble voiding, or hematuria  Neurological ROS: negative for - numbness/tingling or weakness  Dermatological ROS: negative for - rash or skin lesion changes  MSK ROS: positive for - right elbow pain   All other systems reviewed and are negative. Examination   Physical Examination  Vitals:    08/26/22 0806 08/26/22 0810   BP: (!) 158/100 (!) 159/101   Pulse: 75    Resp: 17    Temp: 97.5 °F (36.4 °C)    TempSrc: Temporal    SpO2: 97%    Weight: 173 lb 6.4 oz (78.7 kg)    Height: 5' 1\" (1.549 m)    PainSc:   6       Body mass index is 32.76 kg/m². Visual Acuity: Corrected:            L  20/20            R 20/20  EKG Screening: normal EKG, normal sinus rhythm.      End-of-life planning  Advanced Directive in the case than an injury or illness causes the patient to be unable to make health care decisions  Health Care Directive or Living Will: no    Assessment/Plan:   Education and counseling provided:  End-of-Life planning (with patient's consent)  Diabetes screening test    ICD-10-CM ICD-9-CM    1. Welcome to Medicare preventive visit  Z00.00 V70.0 AMB POC EKG ROUTINE W/ 12 LEADS, INTER & REP      2. Right lateral epicondylitis  M77.11 726.32 diclofenac (VOLTAREN) 1 % gel      ibuprofen (MOTRIN) 800 mg tablet      3. Acute midline low back pain without sciatica  M54.50 724.2       4. Essential hypertension  I10 401.9 losartan (COZAAR) 25 mg tablet      METABOLIC PANEL, COMPREHENSIVE      AMB POC EKG ROUTINE W/ 12 LEADS, INTER & REP      5. Class 1 obesity due to excess calories without serious comorbidity with body mass index (BMI) of 32.0 to 32.9 in adult  E66.09 278.00 CBC WITH AUTOMATED DIFF    Z68.32 V85.32       6. Encounter for lipid screening for cardiovascular disease  Z13.220 V77.91 LIPID PANEL    Z13.6 V81.2       7. Abnormal bone density screening  R93.7 794.9 DEXA BONE DENSITY STUDY AXIAL      . Brief written plan, checklist    I have discussed the diagnosis with the patient and the intended plan as seen in the above orders. The patient has received an after-visit summary and questions were answered concerning future plans. I have discussed medication side effects and warnings with the patient as well. I have reviewed the plan of care with the patient, accepted their input and they are in agreement with the treatment goals. ____________________________________________________________    Problem Assessment    for treatment of   Chief Complaint   Patient presents with    Arm swelling    Back Pain    Physical         SUBJECTIVE    Well Adult Physical   Patient here for a comprehensive physical exam.The patient reports problems - left elbow pain which has been presents for a month; she does not know of injury  she has used ibuprofen. Do you take any herbs or supplements that were not prescribed by a doctor?  no Are you taking calcium supplements? no Are you taking aspirin daily? not applicable    Cardiovascular Review:  The patient has hypertension and obesity. Diet and Lifestyle: not attempting to follow a low fat, low cholesterol diet, not attempting to follow a low sodium diet, does not rigorously follow a diabetic diet, exercises sporadically, nonsmoker  Home BP Monitoring: is not measured at home. Pertinent ROS: taking medications as instructed, no medication side effects noted, no TIA's, no chest pain on exertion, no dyspnea on exertion, no swelling of ankles. Visit Vitals  BP (!) 138/90 (BP 1 Location: Left upper arm, BP Patient Position: Sitting)   Pulse 75   Temp 97.5 °F (36.4 °C) (Temporal)   Resp 17   Ht 5' 1\" (1.549 m)   Wt 173 lb 6.4 oz (78.7 kg)   SpO2 97%   BMI 32.76 kg/m²     General:  Alert, cooperative, no distress, appears stated age. Head:  Normocephalic, without obvious abnormality, atraumatic. Eyes:  Conjunctivae/corneas clear. PERRL, EOMs intact. Ears:  Normal TMs and external ear canals both ears. Nose: Nares normal. Septum midline. Mucosa normal. No drainage or sinus tenderness. Throat: Lips, mucosa, and tongue normal. Teeth and gums normal.   Neck: Supple, symmetrical, trachea midline, no adenopathy, thyroid: no enlargement/tenderness/nodules, no carotid bruit and no JVD. Back:   Symmetric, no curvature. ROM normal. No CVA tenderness. Lungs:   Clear to auscultation bilaterally. Chest wall:  No tenderness or deformity. Heart:  Regular rate and rhythm, S1, S2 normal, no murmur, click, rub or gallop. Breast Exam:  No tenderness, masses, or nipple abnormality. Abdomen:   Soft, non-tender. Bowel sounds normal. No masses,  No organomegaly. Genitalia:  deferred   Rectal:  deferred   Extremities: Extremities normal, atraumatic, no cyanosis or edema. Pulses: 2+ and symmetric all extremities. Skin: Skin color, texture, turgor normal. No rashes or lesions.    Lymph nodes: Cervical, supraclavicular, and axillary nodes normal.   Neurologic: CNII-XII intact. Normal strength, sensation and reflexes throughout. Msk- left lateral epicondyle ttp    LABS     TESTS  EKG normal sinus rhythm    Assessment/Plan:    1. Welcome to Medicare preventive visit  Reviewed preventive recommendations  - AMB POC EKG ROUTINE W/ 12 LEADS, INTER & REP    2. Right lateral epicondylitis  Exercises demonstrated; nsaid prescribed  - diclofenac (VOLTAREN) 1 % gel; Apply  to affected area four (4) times daily. Dispense: 100 g; Refill: 1  - ibuprofen (MOTRIN) 800 mg tablet; Take 1 Tablet by mouth every eight (8) hours as needed for Pain. Dispense: 30 Tablet; Refill: 1    3. Acute midline low back pain without sciatica  Heat and ice; close follow up; no neuro deficits    4. Essential hypertension  Goal <130/80  - losartan (COZAAR) 25 mg tablet; Take 1 Tablet by mouth daily. Dispense: 90 Tablet; Refill: 3  - METABOLIC PANEL, COMPREHENSIVE; Future  - AMB POC EKG ROUTINE W/ 12 LEADS, INTER & REP    5. Class 1 obesity due to excess calories without serious comorbidity with body mass index (BMI) of 32.0 to 32.9 in adult  I have reviewed/discussed the above normal BMI with the patient. I have recommended the following interventions: dietary management education, guidance, and counseling and monitor weight . Baljeet Matta - CBC WITH AUTOMATED DIFF; Future    6. Encounter for lipid screening for cardiovascular disease    - LIPID PANEL; Future    7. Abnormal bone density screening    - DEXA BONE DENSITY STUDY AXIAL;  Future        Lab review: orders written for new lab studies as appropriate; see orders

## 2023-10-06 NOTE — TELEPHONE ENCOUNTER
This patient contacted the office for the following prescriptions to be refilled:    Medication requested :   Requested Prescriptions     Pending Prescriptions Disp Refills    losartan (COZAAR) 25 MG tablet [Pharmacy Med Name: LOSARTAN 25MG TABLETS] 90 tablet      Sig: TAKE 1 TABLET BY MOUTH DAILY      PCP: Sam Espinal MD    NOV DMA: Visit date not found  FUTURE APPT: No future appointments. Thank you.

## 2023-10-10 RX ORDER — LOSARTAN POTASSIUM 25 MG/1
25 TABLET ORAL DAILY
Qty: 90 TABLET | Refills: 3 | Status: SHIPPED | OUTPATIENT
Start: 2023-10-10

## 2023-10-26 ENCOUNTER — TELEPHONE (OUTPATIENT)
Dept: PHARMACY | Facility: CLINIC | Age: 68
End: 2023-10-26

## 2023-10-26 NOTE — TELEPHONE ENCOUNTER
[facility] CLINICAL PHARMACY: ADHERENCE REVIEW  Identified care gap per [employer]: fills at [facility]: ACE/ARB adherence    ASSESSMENT    ACE/ARB ADHERENCE    Insurance Records claims through  10.23.23  (Prior Year  [address] = not reported; YTD  [address] = 76%; Potential Fail Date: 10.29.23):   LOSARTAN POT TAB 25 MG last filled on 23 for 90 day supply. Next refill due: 10.03.23 -PAST DUE 23 DAYS     Prescribed si tablet/capsule daily    Per Insurer Portal: last filled on 23 for 90 day supply. Per [facility]: last picked up on 23 for 90 day supply. will get 90 day supply ready to  since past due. Billed through [place] [place] ([facility] 2 refills remaining. BP Readings from Last 3 Encounters:   22 (!) 138/90   21 138/80   21 129/86     CrCl cannot be calculated (Patient's most recent lab result is older than the maximum 180 days allowed. ). Lab Results   Component Value Date    CREATININE 0.76 2022     Lab Results   Component Value Date    K 3.8 2022       PLAN    Per insurer report, LIS-2 - may be able to receive up to a 100-day supply for the same cost as a 30-day supply    The following are interventions that have been identified:   Patient overdue refilling LOSARTAN POT TAB 25 MG and active on home medication list.   Refill/s of LOSARTAN POT TAB 25 MG READY to  at patient's [address] for a $0 co pay. Attempting to reach patient to review. Left message asking for return call. Letter sent to patient.      Last Visit: 22  Next Visit: none        [facility], [address]   [phone] option 2     For Pharmacy Admin Tracking Only    Program: Leidy in place:  No  Recommendation Provided To: Pharmacy: 1  Intervention Detail: Adherence Monitorin  Intervention Accepted By: Pharmacy: 1  Gap Closed?: No   Time Spent (min): 15

## 2024-08-27 ENCOUNTER — HOSPITAL ENCOUNTER (OUTPATIENT)
Facility: HOSPITAL | Age: 69
Setting detail: SPECIMEN
Discharge: HOME | End: 2024-08-30
Payer: COMMERCIAL

## 2024-08-27 ENCOUNTER — OFFICE VISIT (OUTPATIENT)
Facility: CLINIC | Age: 69
End: 2024-08-27
Payer: COMMERCIAL

## 2024-08-27 VITALS
BODY MASS INDEX: 35.27 KG/M2 | OXYGEN SATURATION: 100 % | DIASTOLIC BLOOD PRESSURE: 84 MMHG | TEMPERATURE: 97.3 F | HEART RATE: 88 BPM | RESPIRATION RATE: 18 BRPM | HEIGHT: 61 IN | SYSTOLIC BLOOD PRESSURE: 132 MMHG | WEIGHT: 186.8 LBS

## 2024-08-27 DIAGNOSIS — E66.01 SEVERE OBESITY (BMI 35.0-39.9) WITH COMORBIDITY (HCC): ICD-10-CM

## 2024-08-27 DIAGNOSIS — Z00.00 MEDICARE ANNUAL WELLNESS VISIT, SUBSEQUENT: Primary | ICD-10-CM

## 2024-08-27 DIAGNOSIS — F17.211 CIGARETTE NICOTINE DEPENDENCE IN REMISSION: ICD-10-CM

## 2024-08-27 DIAGNOSIS — I10 PRIMARY HYPERTENSION: ICD-10-CM

## 2024-08-27 DIAGNOSIS — Z23 NEEDS FLU SHOT: ICD-10-CM

## 2024-08-27 DIAGNOSIS — N95.1 HOT FLASHES DUE TO MENOPAUSE: ICD-10-CM

## 2024-08-27 DIAGNOSIS — Z12.31 ENCOUNTER FOR SCREENING MAMMOGRAM FOR MALIGNANT NEOPLASM OF BREAST: ICD-10-CM

## 2024-08-27 DIAGNOSIS — Z71.89 ADVANCE CARE PLANNING: ICD-10-CM

## 2024-08-27 LAB
ALBUMIN SERPL-MCNC: 3.9 G/DL (ref 3.4–5)
ALBUMIN/GLOB SERPL: 0.9 (ref 0.8–1.7)
ALP SERPL-CCNC: 70 U/L (ref 45–117)
ALT SERPL-CCNC: 30 U/L (ref 13–56)
ANION GAP SERPL CALC-SCNC: 8 MMOL/L (ref 3–18)
AST SERPL-CCNC: 21 U/L (ref 10–38)
BASOPHILS # BLD: 0 K/UL (ref 0–0.1)
BASOPHILS NFR BLD: 1 % (ref 0–2)
BILIRUB SERPL-MCNC: 0.3 MG/DL (ref 0.2–1)
BUN SERPL-MCNC: 15 MG/DL (ref 7–18)
BUN/CREAT SERPL: 17 (ref 12–20)
CALCIUM SERPL-MCNC: 9.6 MG/DL (ref 8.5–10.1)
CHLORIDE SERPL-SCNC: 107 MMOL/L (ref 100–111)
CO2 SERPL-SCNC: 26 MMOL/L (ref 21–32)
CREAT SERPL-MCNC: 0.87 MG/DL (ref 0.6–1.3)
DIFFERENTIAL METHOD BLD: ABNORMAL
EOSINOPHIL # BLD: 0.2 K/UL (ref 0–0.4)
EOSINOPHIL NFR BLD: 2 % (ref 0–5)
ERYTHROCYTE [DISTWIDTH] IN BLOOD BY AUTOMATED COUNT: 14.8 % (ref 11.6–14.5)
GLOBULIN SER CALC-MCNC: 4.3 G/DL (ref 2–4)
GLUCOSE SERPL-MCNC: 93 MG/DL (ref 74–99)
HCT VFR BLD AUTO: 45.9 % (ref 35–45)
HGB BLD-MCNC: 14.1 G/DL (ref 12–16)
IMM GRANULOCYTES # BLD AUTO: 0 K/UL (ref 0–0.04)
IMM GRANULOCYTES NFR BLD AUTO: 0 % (ref 0–0.5)
LYMPHOCYTES # BLD: 2.2 K/UL (ref 0.9–3.6)
LYMPHOCYTES NFR BLD: 35 % (ref 21–52)
MCH RBC QN AUTO: 28.7 PG (ref 24–34)
MCHC RBC AUTO-ENTMCNC: 30.7 G/DL (ref 31–37)
MCV RBC AUTO: 93.5 FL (ref 78–100)
MONOCYTES # BLD: 0.6 K/UL (ref 0.05–1.2)
MONOCYTES NFR BLD: 9 % (ref 3–10)
NEUTS SEG # BLD: 3.4 K/UL (ref 1.8–8)
NEUTS SEG NFR BLD: 53 % (ref 40–73)
NRBC # BLD: 0 K/UL (ref 0–0.01)
NRBC BLD-RTO: 0 PER 100 WBC
PLATELET # BLD AUTO: 231 K/UL (ref 135–420)
PMV BLD AUTO: 11.6 FL (ref 9.2–11.8)
POTASSIUM SERPL-SCNC: 3.5 MMOL/L (ref 3.5–5.5)
PROT SERPL-MCNC: 8.2 G/DL (ref 6.4–8.2)
RBC # BLD AUTO: 4.91 M/UL (ref 4.2–5.3)
SODIUM SERPL-SCNC: 141 MMOL/L (ref 136–145)
WBC # BLD AUTO: 6.4 K/UL (ref 4.6–13.2)

## 2024-08-27 PROCEDURE — 90653 IIV ADJUVANT VACCINE IM: CPT | Performed by: FAMILY MEDICINE

## 2024-08-27 PROCEDURE — 99214 OFFICE O/P EST MOD 30 MIN: CPT | Performed by: FAMILY MEDICINE

## 2024-08-27 PROCEDURE — 36415 COLL VENOUS BLD VENIPUNCTURE: CPT

## 2024-08-27 PROCEDURE — 90471 IMMUNIZATION ADMIN: CPT | Performed by: FAMILY MEDICINE

## 2024-08-27 PROCEDURE — 1036F TOBACCO NON-USER: CPT | Performed by: FAMILY MEDICINE

## 2024-08-27 PROCEDURE — 3017F COLORECTAL CA SCREEN DOC REV: CPT | Performed by: FAMILY MEDICINE

## 2024-08-27 PROCEDURE — G8427 DOCREV CUR MEDS BY ELIG CLIN: HCPCS | Performed by: FAMILY MEDICINE

## 2024-08-27 PROCEDURE — 80053 COMPREHEN METABOLIC PANEL: CPT

## 2024-08-27 PROCEDURE — 1090F PRES/ABSN URINE INCON ASSESS: CPT | Performed by: FAMILY MEDICINE

## 2024-08-27 PROCEDURE — G8417 CALC BMI ABV UP PARAM F/U: HCPCS | Performed by: FAMILY MEDICINE

## 2024-08-27 PROCEDURE — 85025 COMPLETE CBC W/AUTO DIFF WBC: CPT

## 2024-08-27 PROCEDURE — G8399 PT W/DXA RESULTS DOCUMENT: HCPCS | Performed by: FAMILY MEDICINE

## 2024-08-27 PROCEDURE — 99497 ADVNCD CARE PLAN 30 MIN: CPT | Performed by: FAMILY MEDICINE

## 2024-08-27 PROCEDURE — G0439 PPPS, SUBSEQ VISIT: HCPCS | Performed by: FAMILY MEDICINE

## 2024-08-27 RX ORDER — PAROXETINE 10 MG/1
10 TABLET, FILM COATED ORAL DAILY
Qty: 30 TABLET | Refills: 3 | Status: SHIPPED | OUTPATIENT
Start: 2024-08-27

## 2024-08-27 SDOH — ECONOMIC STABILITY: FOOD INSECURITY: WITHIN THE PAST 12 MONTHS, THE FOOD YOU BOUGHT JUST DIDN'T LAST AND YOU DIDN'T HAVE MONEY TO GET MORE.: NEVER TRUE

## 2024-08-27 SDOH — ECONOMIC STABILITY: INCOME INSECURITY: HOW HARD IS IT FOR YOU TO PAY FOR THE VERY BASICS LIKE FOOD, HOUSING, MEDICAL CARE, AND HEATING?: NOT VERY HARD

## 2024-08-27 SDOH — ECONOMIC STABILITY: FOOD INSECURITY: WITHIN THE PAST 12 MONTHS, YOU WORRIED THAT YOUR FOOD WOULD RUN OUT BEFORE YOU GOT MONEY TO BUY MORE.: NEVER TRUE

## 2024-08-27 ASSESSMENT — VISUAL ACUITY
OD_CC: 20/25
OS_CC: 20/70

## 2024-08-27 ASSESSMENT — ANXIETY QUESTIONNAIRES
GAD7 TOTAL SCORE: 0
4. TROUBLE RELAXING: NOT AT ALL
6. BECOMING EASILY ANNOYED OR IRRITABLE: NOT AT ALL
1. FEELING NERVOUS, ANXIOUS, OR ON EDGE: NOT AT ALL
2. NOT BEING ABLE TO STOP OR CONTROL WORRYING: NOT AT ALL
7. FEELING AFRAID AS IF SOMETHING AWFUL MIGHT HAPPEN: NOT AT ALL
5. BEING SO RESTLESS THAT IT IS HARD TO SIT STILL: NOT AT ALL
IF YOU CHECKED OFF ANY PROBLEMS ON THIS QUESTIONNAIRE, HOW DIFFICULT HAVE THESE PROBLEMS MADE IT FOR YOU TO DO YOUR WORK, TAKE CARE OF THINGS AT HOME, OR GET ALONG WITH OTHER PEOPLE: NOT DIFFICULT AT ALL
3. WORRYING TOO MUCH ABOUT DIFFERENT THINGS: NOT AT ALL

## 2024-08-27 ASSESSMENT — PATIENT HEALTH QUESTIONNAIRE - PHQ9
SUM OF ALL RESPONSES TO PHQ QUESTIONS 1-9: 0
SUM OF ALL RESPONSES TO PHQ QUESTIONS 1-9: 0
1. LITTLE INTEREST OR PLEASURE IN DOING THINGS: NOT AT ALL
SUM OF ALL RESPONSES TO PHQ QUESTIONS 1-9: 0
SUM OF ALL RESPONSES TO PHQ9 QUESTIONS 1 & 2: 0
SUM OF ALL RESPONSES TO PHQ QUESTIONS 1-9: 0
2. FEELING DOWN, DEPRESSED OR HOPELESS: NOT AT ALL

## 2024-08-27 ASSESSMENT — LIFESTYLE VARIABLES
HOW MANY STANDARD DRINKS CONTAINING ALCOHOL DO YOU HAVE ON A TYPICAL DAY: PATIENT DOES NOT DRINK
HOW OFTEN DO YOU HAVE A DRINK CONTAINING ALCOHOL: NEVER

## 2024-08-27 NOTE — PATIENT INSTRUCTIONS
Learning About Vision Tests  What are vision tests?     The four most common vision tests are visual acuity tests, refraction, visual field tests, and color vision tests.  Visual acuity (sharpness) tests  These tests are used:  To see if you need glasses or contact lenses.  To monitor an eye problem.  To check an eye injury.  Visual acuity tests are done as part of routine exams. You may also have this test when you get your 's license or apply for some types of jobs.  Visual field tests  These tests are used:  To check for vision loss in any area of your range of vision.  To screen for certain eye diseases.  To look for nerve damage after a stroke, head injury, or other problem that could reduce blood flow to the brain.  Refraction and color tests  A refraction test is done to find the right prescription for glasses and contact lenses.  A color vision test is done to check for color blindness.  Color vision is often tested as part of a routine exam. You may also have this test when you apply for a job where recognizing different colors is important, such as , electronics, or the .  How are vision tests done?  Visual acuity test   You cover one eye at a time.  You read aloud from a wall chart across the room.  You read aloud from a small card that you hold in your hand.  Refraction   You look into a special device.  The device puts lenses of different strengths in front of each eye to see how strong your glasses or contact lenses need to be.  Visual field tests   Your doctor may have you look through special machines.  Or your doctor may simply have you stare straight ahead while they move a finger into and out of your field of vision.  Color vision test   You look at pieces of printed test patterns in various colors. You say what number or symbol you see.  Your doctor may have you trace the number or symbol using a pointer.  How do these tests feel?  There is very little chance of  that's healthy for you. Talk to your doctor if you need help losing weight.     Try to get 7 to 9 hours of sleep each night.     Limit alcohol to 2 drinks a day for men and 1 drink a day for women. Too much alcohol can cause health problems.     Manage other health problems such as diabetes, high blood pressure, and high cholesterol. If you think you may have a problem with alcohol or drug use, talk to your doctor.   Medicines    Take your medicines exactly as prescribed. Call your doctor if you think you are having a problem with your medicine.     If your doctor recommends aspirin, take the amount directed each day. Make sure you take aspirin and not another kind of pain reliever, such as acetaminophen (Tylenol).   When should you call for help?   Call 911 if you have symptoms of a heart attack. These may include:    Chest pain or pressure, or a strange feeling in the chest.     Sweating.     Shortness of breath.     Pain, pressure, or a strange feeling in the back, neck, jaw, or upper belly or in one or both shoulders or arms.     Lightheadedness or sudden weakness.     A fast or irregular heartbeat.   After you call 911, the  may tell you to chew 1 adult-strength or 2 to 4 low-dose aspirin. Wait for an ambulance. Do not try to drive yourself.  Watch closely for changes in your health, and be sure to contact your doctor if you have any problems.  Where can you learn more?  Go to https://www.[employer].net/patientEd and enter F075 to learn more about \"A Healthy Heart: Care Instructions.\"  Current as of: June 24, 2023  Content Version: 14.1  © 2006-2024 [employer].   Care instructions adapted under license by [employer]. If you have questions about a medical condition or this instruction, always ask your healthcare professional. [employer] disclaims any warranty or liability for your use of this information.      Personalized Preventive Plan for Janis Pascual

## 2024-08-27 NOTE — PROGRESS NOTES
Directive:  1. Do you have an Advanced Directive? No     2. Would you like information on Advanced Directives? No

## 2024-08-27 NOTE — PROGRESS NOTES
Janis Meza is a 68 y.o.  female and presents with    Chief Complaint   Patient presents with    Annual Exam    hot flashes    Medicare AWV         Subjective:  Well Adult Physical   Patient here for a comprehensive physical exam.The patient reports problems - hot flash and reports that her daughter is taking paxil with good results  Do you take any herbs or supplements that were not prescribed by a doctor? yes Are you taking calcium supplements? no Are you taking aspirin daily? not applicable  She has hypertension and is taking losartan as prescribed.      ROS   General ROS: negative for - chills, fatigue or fever; + hot flashes  Psychological ROS: negative for - anxiety or depression  Ophthalmic ROS: negative for - blurry vision or itchy eyes  ENT ROS: negative for - headaches, nasal congestion or sore throat  Endocrine ROS: negative for - polydipsia/polyuria or temperature intolerance  Respiratory ROS: no cough, shortness of breath, or wheezing  Cardiovascular ROS: no chest pain or dyspnea on exertion  Gastrointestinal ROS: no abdominal pain, change in bowel habits, or black or bloody stools  Genito-Urinary ROS: no dysuria, trouble voiding, or hematuria  Neurological ROS: negative for - numbness/tingling or weakness  Dermatological ROS: negative for - rash or skin lesion changes  MSK ROS: negative for - joint pain or muscle pain    All other systems reviewed and are negative.    Objective:    /84 (Site: Right Upper Arm, Position: Sitting, Cuff Size: Medium Adult)   Pulse 88   Temp 97.3 °F (36.3 °C) (Temporal)   Resp 18   Ht 1.549 m (5' 1\")   Wt 84.7 kg (186 lb 12.8 oz)   SpO2 100%   BMI 35.30 kg/m²     General Appearance:  Alert, cooperative, no distress, appears stated age   Head:  Normocephalic, without obvious abnormality, atraumatic   Eyes:  PERRL, conjunctiva/corneas clear, EOM's intact   Ears:  Normal TM's and external ear canals, both ears   Nose: Nares normal, septum  future plans.  I have discussed medication side effects and warnings with the patient as well. I have reviewed the plan of care with the patient, accepted their input and they are in agreement with the treatment goals.     Medicare Annual Wellness Visit    Janis Meza is here for Annual Exam, hot flashes, and Medicare AWV    Assessment & Plan   Medicare annual wellness visit, subsequent  Advance care planning  Hot flashes due to menopause  -     PARoxetine (PAXIL) 10 MG tablet; Take 1 tablet by mouth daily, Disp-30 tablet, R-3Normal  Primary hypertension  -     Comprehensive Metabolic Panel; Future  Severe obesity (BMI 35.0-39.9) with comorbidity (HCC)  -     CBC with Auto Differential; Future  Encounter for screening mammogram for malignant neoplasm of breast  -     KEENAN DIGITAL SCREEN W OR WO CAD BILATERAL; Future  Cigarette nicotine dependence in remission    Recommendations for Preventive Services Due: see orders and patient instructions/AVS.  Recommended screening schedule for the next 5-10 years is provided to the patient in written form: see Patient Instructions/AVS.     Return in about 1 month (around 9/27/2024) for medication evaluation, result review.     Subjective   The following acute and/or chronic problems were also addressed today:  Hot flashes  hypertension    Patient's complete Health Risk Assessment and screening values have been reviewed and are found in Flowsheets. The following problems were reviewed today and where indicated follow up appointments were made and/or referrals ordered.    Positive Risk Factor Screenings with Interventions:                  Abnormal BMI (obese):  Body mass index is 35.3 kg/m². (!) Abnormal  Interventions:  low carbohydrate diet          Vision Screen:  Visual Acuity screen is abnormal due to a score of 20/25 or worse.  Do you have difficulty driving, watching TV, or doing any of your daily activities because of your eyesight?: No  Have you had an eye exam

## 2024-10-02 ENCOUNTER — OFFICE VISIT (OUTPATIENT)
Facility: CLINIC | Age: 69
End: 2024-10-02

## 2024-10-02 VITALS
HEIGHT: 61 IN | TEMPERATURE: 97 F | SYSTOLIC BLOOD PRESSURE: 133 MMHG | OXYGEN SATURATION: 99 % | RESPIRATION RATE: 18 BRPM | BODY MASS INDEX: 35.12 KG/M2 | WEIGHT: 186 LBS | DIASTOLIC BLOOD PRESSURE: 87 MMHG | HEART RATE: 68 BPM

## 2024-10-02 DIAGNOSIS — Z12.11 COLON CANCER SCREENING: ICD-10-CM

## 2024-10-02 DIAGNOSIS — E66.01 SEVERE OBESITY (BMI 35.0-39.9) WITH COMORBIDITY: Primary | ICD-10-CM

## 2024-10-02 DIAGNOSIS — F17.211 CIGARETTE NICOTINE DEPENDENCE IN REMISSION: ICD-10-CM

## 2024-10-02 DIAGNOSIS — R63.2 BINGE EATING: ICD-10-CM

## 2024-10-02 DIAGNOSIS — I10 PRIMARY HYPERTENSION: ICD-10-CM

## 2024-10-02 DIAGNOSIS — N95.1 HOT FLASHES DUE TO MENOPAUSE: ICD-10-CM

## 2024-10-02 RX ORDER — BUPROPION HYDROCHLORIDE 150 MG/1
150 TABLET ORAL EVERY MORNING
Qty: 30 TABLET | Refills: 3 | Status: SHIPPED | OUTPATIENT
Start: 2024-10-02

## 2024-10-02 RX ORDER — PAROXETINE 10 MG/1
10 TABLET, FILM COATED ORAL DAILY
Qty: 90 TABLET | Refills: 3 | Status: SHIPPED | OUTPATIENT
Start: 2024-10-02

## 2024-10-02 RX ORDER — PHENTERMINE HYDROCHLORIDE 37.5 MG/1
37.5 CAPSULE ORAL EVERY MORNING
Qty: 30 CAPSULE | Refills: 0 | Status: SHIPPED | OUTPATIENT
Start: 2024-10-02 | End: 2024-11-01

## 2024-10-02 SDOH — ECONOMIC STABILITY: FOOD INSECURITY: WITHIN THE PAST 12 MONTHS, YOU WORRIED THAT YOUR FOOD WOULD RUN OUT BEFORE YOU GOT MONEY TO BUY MORE.: NEVER TRUE

## 2024-10-02 SDOH — ECONOMIC STABILITY: FOOD INSECURITY: WITHIN THE PAST 12 MONTHS, THE FOOD YOU BOUGHT JUST DIDN'T LAST AND YOU DIDN'T HAVE MONEY TO GET MORE.: NEVER TRUE

## 2024-10-02 ASSESSMENT — PATIENT HEALTH QUESTIONNAIRE - PHQ9
SUM OF ALL RESPONSES TO PHQ QUESTIONS 1-9: 0
2. FEELING DOWN, DEPRESSED OR HOPELESS: NOT AT ALL
SUM OF ALL RESPONSES TO PHQ QUESTIONS 1-9: 0

## 2024-10-02 NOTE — PROGRESS NOTES
Janis Meza is a 68 y.o. year old female who presents today for   Chief Complaint   Patient presents with    Medication Refill        \"Have you been to the ER, urgent care clinic since your last visit?  Hospitalized since your last visit?\"   NO     “Have you seen or consulted any other health care providers outside our system since your last visit?”   NO     Have you had a mammogram?”   YES - Where: yes  Nurse/CMA to request most recent records if not in the chart    Date of last Mammogram: 8/26/2022       “Have you had a colorectal cancer screening such as a colonoscopy/FIT/Cologuard?    YES - Type: Cologuard , but she is due for repeat    Date of last Colonoscopy: 9/30/2021  Date of last Cologuard: 9/30/2021  No FIT/FOBT on file   No flexible sigmoidoscopy on file           Valentina Oropeza CMA  [facility]  [address] [place] [address] [address]  Ph: [phone]  Direct Fax: [phone]

## 2024-10-02 NOTE — PROGRESS NOTES
Janis Meza is a 68 y.o.  female and presents with    Chief Complaint   Patient presents with    Medication Refill       Subjective:  Ms. Meza is a 69 yo female with no pmh who presents for medication evaluation. She has no complaints today. No side effects with the medication. She had a fall a couple of days ago and her ankle experienced some mild swelling for which she took ibuprofen which provided relief and she has not had any pain since. She has continued in remission from cigarette smoking for 6 mo now.     She is interested in losing weight. She does not eat a balanced diet however she exercises 4x a week.       Patient Active Problem List    Diagnosis Date Noted    Postmenopausal state 2014     Current Outpatient Medications   Medication Sig Dispense Refill    PARoxetine (PAXIL) 10 MG tablet Take 1 tablet by mouth daily 30 tablet 3    losartan (COZAAR) 25 MG tablet TAKE 1 TABLET BY MOUTH DAILY 90 tablet 3     No current facility-administered medications for this visit.     No Known Allergies  History reviewed. No pertinent past medical history.  Past Surgical History:   Procedure Laterality Date    CHOLECYSTECTOMY      TUBAL LIGATION       Family History   Problem Relation Age of Onset    Liver Disease Mother         cirrohosis     Social History     Tobacco Use    Smoking status: Former     Current packs/day: 0.00     Types: Cigarettes     Quit date:      Years since quittin.7    Smokeless tobacco: Never   Substance Use Topics    Alcohol use: Yes       ROS   General ROS: negative for - chills, fatigue or fever; positive for hot flashes  Psychological ROS: negative for - anxiety or depression  Ophthalmic ROS: negative for - blurry vision or itchy eyes  ENT ROS: negative for - headaches, nasal congestion or sore throat  Endocrine ROS: negative for - polydipsia/polyuria or temperature intolerance  Respiratory ROS: no cough, shortness of breath, or wheezing  Cardiovascular

## 2024-10-11 LAB — NONINV COLON CA DNA+OCC BLD SCRN STL QL: NEGATIVE

## 2024-11-01 RX ORDER — LOSARTAN POTASSIUM 25 MG/1
25 TABLET ORAL DAILY
Qty: 90 TABLET | Refills: 3 | Status: SHIPPED | OUTPATIENT
Start: 2024-11-01

## 2024-11-05 ENCOUNTER — HOSPITAL ENCOUNTER (OUTPATIENT)
Dept: WOMENS IMAGING | Facility: HOSPITAL | Age: 69
Discharge: HOME | End: 2024-11-08
Attending: FAMILY MEDICINE
Payer: MEDICARE

## 2024-11-05 DIAGNOSIS — Z12.31 ENCOUNTER FOR SCREENING MAMMOGRAM FOR MALIGNANT NEOPLASM OF BREAST: ICD-10-CM

## 2024-11-05 PROCEDURE — 77063 BREAST TOMOSYNTHESIS BI: CPT

## 2024-11-12 ENCOUNTER — OFFICE VISIT (OUTPATIENT)
Facility: CLINIC | Age: 69
End: 2024-11-12
Payer: MEDICAID

## 2024-11-12 VITALS
OXYGEN SATURATION: 98 % | RESPIRATION RATE: 17 BRPM | DIASTOLIC BLOOD PRESSURE: 92 MMHG | TEMPERATURE: 97.1 F | SYSTOLIC BLOOD PRESSURE: 136 MMHG | HEART RATE: 85 BPM | BODY MASS INDEX: 35.04 KG/M2 | WEIGHT: 185.6 LBS | HEIGHT: 61 IN

## 2024-11-12 DIAGNOSIS — K13.70 ORAL LESION: ICD-10-CM

## 2024-11-12 DIAGNOSIS — N95.1 HOT FLASHES DUE TO MENOPAUSE: ICD-10-CM

## 2024-11-12 DIAGNOSIS — I10 PRIMARY HYPERTENSION: ICD-10-CM

## 2024-11-12 DIAGNOSIS — K05.10 GINGIVITIS: Primary | ICD-10-CM

## 2024-11-12 PROCEDURE — 3080F DIAST BP >= 90 MM HG: CPT | Performed by: FAMILY MEDICINE

## 2024-11-12 PROCEDURE — 1123F ACP DISCUSS/DSCN MKR DOCD: CPT | Performed by: FAMILY MEDICINE

## 2024-11-12 PROCEDURE — 99214 OFFICE O/P EST MOD 30 MIN: CPT | Performed by: FAMILY MEDICINE

## 2024-11-12 PROCEDURE — 3075F SYST BP GE 130 - 139MM HG: CPT | Performed by: FAMILY MEDICINE

## 2024-11-12 SDOH — ECONOMIC STABILITY: INCOME INSECURITY: HOW HARD IS IT FOR YOU TO PAY FOR THE VERY BASICS LIKE FOOD, HOUSING, MEDICAL CARE, AND HEATING?: NOT VERY HARD

## 2024-11-12 SDOH — ECONOMIC STABILITY: FOOD INSECURITY: WITHIN THE PAST 12 MONTHS, YOU WORRIED THAT YOUR FOOD WOULD RUN OUT BEFORE YOU GOT MONEY TO BUY MORE.: NEVER TRUE

## 2024-11-12 SDOH — ECONOMIC STABILITY: FOOD INSECURITY: WITHIN THE PAST 12 MONTHS, THE FOOD YOU BOUGHT JUST DIDN'T LAST AND YOU DIDN'T HAVE MONEY TO GET MORE.: NEVER TRUE

## 2024-11-12 ASSESSMENT — PATIENT HEALTH QUESTIONNAIRE - PHQ9
SUM OF ALL RESPONSES TO PHQ QUESTIONS 1-9: 0
1. LITTLE INTEREST OR PLEASURE IN DOING THINGS: NOT AT ALL
2. FEELING DOWN, DEPRESSED OR HOPELESS: NOT AT ALL
SUM OF ALL RESPONSES TO PHQ QUESTIONS 1-9: 0
SUM OF ALL RESPONSES TO PHQ9 QUESTIONS 1 & 2: 0

## 2024-11-12 NOTE — PROGRESS NOTES
Janis Meza is a 68 y.o. year old female who presents today for No chief complaint on file.       \"Have you been to the ER, urgent care clinic since your last visit?  Hospitalized since your last visit?\"   NO     “Have you seen or consulted any other health care providers outside our system since your last visit?”   NO             Valentina Oropeza [facility]  [address] [place] [address] [address]  Ph: [phone]  Direct Fax: [phone]  
evaluation and treatment  - External Referral To Oral Maxillofacial Surgery    2. Oral lesion    - External Referral To Oral Maxillofacial Surgery    3. Primary hypertension  Goal <130/80; borderline controlled    4. Hot flashes due to menopause  Paroxetine ordered for symptoms       Lab review: labs are reviewed, up to date and normal    Follow up in 3mon    I have discussed the diagnosis with the patient and the intended plan as seen in the above orders.  The patient has received an after-visit summary and questions were answered concerning future plans.  I have discussed medication side effects and warnings with the patient as well. I have reviewed the plan of care with the patient, accepted their input and they are in agreement with the treatment goals.

## 2024-12-19 ENCOUNTER — TELEPHONE (OUTPATIENT)
Dept: PHARMACY | Facility: CLINIC | Age: 69
End: 2024-12-19

## 2024-12-19 NOTE — TELEPHONE ENCOUNTER
[facility] CLINICAL PHARMACY: ADHERENCE REVIEW  Identified care gap per [place] fills with [facility]: ACE/ARB adherence    Medicare and prison Dual Special Needs Plan - MRDSNP  MA-PCPi  Per insurer report, LIS-2 - may be able to receive up to a 100-day supply for the same cost as a 30-day supply.    ASSESSMENT    ACE/ARB ADHERENCE    Insurance Records claims through  24  (Prior Year PDC = not reported; YTD PDC = 83%; Potential Fail Date: 24):   LOSARTAN POT TAB 25 MG last filled on 24 for 90 day supply. Next refill due: 10.26.24    Prescribed si tablet/capsule daily    Per Insurer Portal: last filled on 24 for 90 day supply.     Per [facility]: Billed through [facility]. 4 refills remaining. will get  90 day supply ready to  since past due. Will be ready to pickup, tomorrow, 24 after 3 PM.    BP Readings from Last 3 Encounters:   24 (!) 136/92   10/02/24 133/87   24 132/84     Estimated Creatinine Clearance: 61 mL/min (based on SCr of 0.87 mg/dL).  Lab Results   Component Value Date    CREATININE 0.87 2024     Lab Results   Component Value Date    K 3.5 2024       PLAN  The following are interventions that have been identified:   Patient OVERDUE refilling LOSARTAN POT TAB 25 MG and active on home medication list.   Refill/s of LOSARTAN POT TAB 25 MG [facility] Will be ready to pickup, tomorrow, 24 after 3 PM.  Patient eligible for 100 day supply    Outreach:  Pharmacy:  [facility] will fill 90 day supply of LOSARTAN POT TAB 25 MG today 24 . Will be ready to pickup, tomorrow, 24 after 3 PM.    Patient:  Letter sent to patient.   I contacted [facility] to confirm they received the prescription that your provider sent over on 24, and they did, so they are processing a 90 day supply since it is due for a refill.  This medication is filled and ready for you at:

## 2025-05-07 ENCOUNTER — OFFICE VISIT (OUTPATIENT)
Facility: CLINIC | Age: 70
End: 2025-05-07
Payer: MEDICARE

## 2025-05-07 VITALS
TEMPERATURE: 98.2 F | SYSTOLIC BLOOD PRESSURE: 144 MMHG | HEIGHT: 61 IN | BODY MASS INDEX: 33.95 KG/M2 | HEART RATE: 71 BPM | RESPIRATION RATE: 20 BRPM | WEIGHT: 179.8 LBS | DIASTOLIC BLOOD PRESSURE: 83 MMHG | OXYGEN SATURATION: 94 %

## 2025-05-07 DIAGNOSIS — Z09 HOSPITAL DISCHARGE FOLLOW-UP: Primary | ICD-10-CM

## 2025-05-07 DIAGNOSIS — H81.11 BENIGN PAROXYSMAL POSITIONAL VERTIGO OF RIGHT EAR: ICD-10-CM

## 2025-05-07 DIAGNOSIS — K52.9 ACUTE GASTROENTERITIS: ICD-10-CM

## 2025-05-07 DIAGNOSIS — F17.211 CIGARETTE NICOTINE DEPENDENCE IN REMISSION: ICD-10-CM

## 2025-05-07 DIAGNOSIS — I10 PRIMARY HYPERTENSION: ICD-10-CM

## 2025-05-07 PROCEDURE — G2211 COMPLEX E/M VISIT ADD ON: HCPCS | Performed by: FAMILY MEDICINE

## 2025-05-07 PROCEDURE — 99214 OFFICE O/P EST MOD 30 MIN: CPT | Performed by: FAMILY MEDICINE

## 2025-05-07 PROCEDURE — 1160F RVW MEDS BY RX/DR IN RCRD: CPT | Performed by: FAMILY MEDICINE

## 2025-05-07 PROCEDURE — 1123F ACP DISCUSS/DSCN MKR DOCD: CPT | Performed by: FAMILY MEDICINE

## 2025-05-07 PROCEDURE — 1159F MED LIST DOCD IN RCRD: CPT | Performed by: FAMILY MEDICINE

## 2025-05-07 PROCEDURE — 3017F COLORECTAL CA SCREEN DOC REV: CPT | Performed by: FAMILY MEDICINE

## 2025-05-07 PROCEDURE — 3079F DIAST BP 80-89 MM HG: CPT | Performed by: FAMILY MEDICINE

## 2025-05-07 PROCEDURE — G8427 DOCREV CUR MEDS BY ELIG CLIN: HCPCS | Performed by: FAMILY MEDICINE

## 2025-05-07 PROCEDURE — 3077F SYST BP >= 140 MM HG: CPT | Performed by: FAMILY MEDICINE

## 2025-05-07 PROCEDURE — 1036F TOBACCO NON-USER: CPT | Performed by: FAMILY MEDICINE

## 2025-05-07 PROCEDURE — 1111F DSCHRG MED/CURRENT MED MERGE: CPT | Performed by: FAMILY MEDICINE

## 2025-05-07 PROCEDURE — G8399 PT W/DXA RESULTS DOCUMENT: HCPCS | Performed by: FAMILY MEDICINE

## 2025-05-07 PROCEDURE — 1090F PRES/ABSN URINE INCON ASSESS: CPT | Performed by: FAMILY MEDICINE

## 2025-05-07 PROCEDURE — G8417 CALC BMI ABV UP PARAM F/U: HCPCS | Performed by: FAMILY MEDICINE

## 2025-05-07 RX ORDER — LOSARTAN POTASSIUM 25 MG/1
25 TABLET ORAL DAILY
Qty: 90 TABLET | Refills: 3 | Status: SHIPPED | OUTPATIENT
Start: 2025-05-07

## 2025-05-07 RX ORDER — ONDANSETRON 4 MG/1
4 TABLET, FILM COATED ORAL EVERY 8 HOURS PRN
Qty: 30 TABLET | Refills: 1 | Status: SHIPPED | OUTPATIENT
Start: 2025-05-07

## 2025-05-07 RX ORDER — ONDANSETRON 4 MG/1
4 TABLET, FILM COATED ORAL EVERY 8 HOURS PRN
COMMUNITY
End: 2025-05-07 | Stop reason: SDUPTHER

## 2025-05-07 RX ORDER — MECLIZINE HYDROCHLORIDE 25 MG/1
25 TABLET ORAL 3 TIMES DAILY PRN
Qty: 90 TABLET | Refills: 1 | Status: SHIPPED | OUTPATIENT
Start: 2025-05-07

## 2025-05-07 RX ORDER — MECLIZINE HYDROCHLORIDE 25 MG/1
25 TABLET ORAL 3 TIMES DAILY PRN
COMMUNITY
End: 2025-05-07 | Stop reason: SDUPTHER

## 2025-05-07 SDOH — ECONOMIC STABILITY: FOOD INSECURITY: WITHIN THE PAST 12 MONTHS, THE FOOD YOU BOUGHT JUST DIDN'T LAST AND YOU DIDN'T HAVE MONEY TO GET MORE.: NEVER TRUE

## 2025-05-07 ASSESSMENT — PATIENT HEALTH QUESTIONNAIRE - PHQ9
2. FEELING DOWN, DEPRESSED OR HOPELESS: NOT AT ALL
SUM OF ALL RESPONSES TO PHQ QUESTIONS 1-9: 0
SUM OF ALL RESPONSES TO PHQ QUESTIONS 1-9: 0
1. LITTLE INTEREST OR PLEASURE IN DOING THINGS: NOT AT ALL
SUM OF ALL RESPONSES TO PHQ QUESTIONS 1-9: 0
SUM OF ALL RESPONSES TO PHQ QUESTIONS 1-9: 0

## 2025-05-07 NOTE — PROGRESS NOTES
Janis Meza is a 69 y.o. year old female who presents today for   Chief Complaint   Patient presents with    Follow-Up from Hospital        \"Have you been to the ER, urgent care clinic since your last visit?  Hospitalized since your last visit?\"   YES - When: approximately 10 days ago.  Where and Why: [facility] ED / [facility].     “Have you seen or consulted any other health care providers outside our system since your last visit?”   NO             Chasidy Santamaria  [facility]  Phone: [phone]  Fax: [phone]

## 2025-05-07 NOTE — PROGRESS NOTES
Janis Meza is a 69 y.o.  female and presents with    Chief Complaint   Patient presents with    Follow-Up from Hospital     Subjective:  Ms. Meza is following up after going to ER.  She had diarrhea.  She had low blood pressure.  She feels lightheaded and dizzy.  She takes meclizine in the morning.      She continues to have sweats    ROS   General ROS: negative for - chills, fatigue or fever; positive for hot flashes  Psychological ROS: negative for - anxiety or depression  Ophthalmic ROS: negative for - blurry vision or itchy eyes  ENT ROS: negative for - headaches, nasal congestion or sore throat  Endocrine ROS: negative for - polydipsia/polyuria or temperature intolerance  Respiratory ROS: no cough, shortness of breath, or wheezing  Cardiovascular ROS: no chest pain or dyspnea on exertion  Gastrointestinal ROS: no abdominal pain, change in bowel habits, or black or bloody stools  Genito-Urinary ROS: no dysuria, trouble voiding, or hematuria  Neurological ROS: negative for - numbness/tingling or weakness  Dermatological ROS: negative for - rash or skin lesion changes  MSK ROS: negative for - joint pain or muscle pain    All other systems reviewed and are negative.      Objective:  Vitals:    05/07/25 0902   BP: (!) 144/83   Pulse:    Resp:    Temp:    SpO2:      alert, well appearing, and in no distress and oriented to person, place, and time  Mental status - normal mood, behavior, speech, dress, motor activity, and thought processes  HEENT- Tms normal  Chest - clear to auscultation, no wheezes, rales or rhonchi, symmetric air entry  Heart - normal rate, regular rhythm, normal S1, S2, no murmurs, rubs, clicks or gallops  Neurological - cranial nerves II through XII intact, guarded gait    LABS     TESTS      Assessment/Plan:    1. Hospital discharge follow-up    - RI DISCHARGE MEDS RECONCILED W/ CURRENT OUTPATIENT MED LIST    2. Primary hypertension  Goal <130/80; encourage low salt diet

## 2025-05-09 DIAGNOSIS — N95.1 HOT FLASHES DUE TO MENOPAUSE: ICD-10-CM

## 2025-05-12 NOTE — TELEPHONE ENCOUNTER
Medication(s) requesting:   Requested Prescriptions     Pending Prescriptions Disp Refills    PARoxetine (PAXIL) 10 MG tablet [Pharmacy Med Name: PAROXETINE 10MG TABLETS] 30 tablet      Sig: TAKE 1 TABLET BY MOUTH DAILY       Last office visit:  05/07/2025  Next office visit DMA: 8/28/2025

## 2025-05-13 RX ORDER — PAROXETINE 10 MG/1
10 TABLET, FILM COATED ORAL DAILY
Qty: 90 TABLET | Refills: 3 | Status: SHIPPED | OUTPATIENT
Start: 2025-05-13

## 2025-06-08 DIAGNOSIS — R63.2 BINGE EATING: ICD-10-CM

## 2025-06-08 DIAGNOSIS — E66.01 SEVERE OBESITY (BMI 35.0-39.9) WITH COMORBIDITY (HCC): ICD-10-CM

## 2025-06-09 NOTE — TELEPHONE ENCOUNTER
Medication requested :   Requested Prescriptions     Pending Prescriptions Disp Refills    buPROPion (WELLBUTRIN XL) 150 MG extended release tablet [Pharmacy Med Name: BUPROPION XL 150MG TABLETS (24 H)] 30 tablet 3     Sig: TAKE 1 TABLET BY MOUTH EVERY MORNING      PCP: Shadi Bruno MD  LOV:           5/7/2025   NOV DMA: 8/28/2025  FUTURE APPT:   Future Appointments   Date Time Provider Department Center   8/28/2025  9:00 AM Shadi Bruno MD [facility] DEP       Thank you.

## 2025-06-10 RX ORDER — BUPROPION HYDROCHLORIDE 150 MG/1
150 TABLET ORAL EVERY MORNING
Qty: 30 TABLET | Refills: 3 | Status: SHIPPED | OUTPATIENT
Start: 2025-06-10

## 2025-08-11 DIAGNOSIS — H81.11 BENIGN PAROXYSMAL POSITIONAL VERTIGO OF RIGHT EAR: ICD-10-CM

## 2025-08-11 RX ORDER — MECLIZINE HYDROCHLORIDE 25 MG/1
25 TABLET ORAL 3 TIMES DAILY PRN
Qty: 90 TABLET | Refills: 1 | Status: SHIPPED | OUTPATIENT
Start: 2025-08-11